# Patient Record
Sex: FEMALE | Race: BLACK OR AFRICAN AMERICAN | NOT HISPANIC OR LATINO | Employment: FULL TIME | URBAN - METROPOLITAN AREA
[De-identification: names, ages, dates, MRNs, and addresses within clinical notes are randomized per-mention and may not be internally consistent; named-entity substitution may affect disease eponyms.]

---

## 2023-10-24 ENCOUNTER — HOSPITAL ENCOUNTER (EMERGENCY)
Facility: HOSPITAL | Age: 43
Discharge: HOME | End: 2023-10-24
Attending: EMERGENCY MEDICINE
Payer: COMMERCIAL

## 2023-10-24 VITALS
OXYGEN SATURATION: 97 % | BODY MASS INDEX: 25.44 KG/M2 | TEMPERATURE: 98.3 F | DIASTOLIC BLOOD PRESSURE: 119 MMHG | WEIGHT: 149 LBS | SYSTOLIC BLOOD PRESSURE: 185 MMHG | HEIGHT: 64 IN | HEART RATE: 94 BPM

## 2023-10-24 DIAGNOSIS — S05.02XA ABRASION OF LEFT CORNEA, INITIAL ENCOUNTER: Primary | ICD-10-CM

## 2023-10-24 PROCEDURE — 2500000004 HC RX 250 GENERAL PHARMACY W/ HCPCS (ALT 636 FOR OP/ED): Performed by: STUDENT IN AN ORGANIZED HEALTH CARE EDUCATION/TRAINING PROGRAM

## 2023-10-24 PROCEDURE — 99284 EMERGENCY DEPT VISIT MOD MDM: CPT | Performed by: EMERGENCY MEDICINE

## 2023-10-24 PROCEDURE — 90471 IMMUNIZATION ADMIN: CPT | Performed by: STUDENT IN AN ORGANIZED HEALTH CARE EDUCATION/TRAINING PROGRAM

## 2023-10-24 PROCEDURE — 2500000001 HC RX 250 WO HCPCS SELF ADMINISTERED DRUGS (ALT 637 FOR MEDICARE OP): Performed by: STUDENT IN AN ORGANIZED HEALTH CARE EDUCATION/TRAINING PROGRAM

## 2023-10-24 PROCEDURE — 99283 EMERGENCY DEPT VISIT LOW MDM: CPT | Mod: 25

## 2023-10-24 PROCEDURE — 90715 TDAP VACCINE 7 YRS/> IM: CPT | Performed by: STUDENT IN AN ORGANIZED HEALTH CARE EDUCATION/TRAINING PROGRAM

## 2023-10-24 RX ORDER — TETRACAINE HYDROCHLORIDE 5 MG/ML
1 SOLUTION OPHTHALMIC ONCE
Status: DISCONTINUED | OUTPATIENT
Start: 2023-10-24 | End: 2023-10-24 | Stop reason: HOSPADM

## 2023-10-24 RX ORDER — ERYTHROMYCIN 5 MG/G
OINTMENT OPHTHALMIC EVERY 4 HOURS
Qty: 3.5 G | Refills: 0 | Status: SHIPPED | OUTPATIENT
Start: 2023-10-24 | End: 2023-10-31

## 2023-10-24 RX ORDER — SPIRONOLACTONE 25 MG/1
25 TABLET ORAL DAILY
Status: DISCONTINUED | OUTPATIENT
Start: 2023-10-24 | End: 2023-10-24 | Stop reason: HOSPADM

## 2023-10-24 RX ORDER — LOSARTAN POTASSIUM 50 MG/1
100 TABLET ORAL ONCE
Status: COMPLETED | OUTPATIENT
Start: 2023-10-24 | End: 2023-10-24

## 2023-10-24 RX ORDER — ERYTHROMYCIN 5 MG/G
1 OINTMENT OPHTHALMIC ONCE
Status: COMPLETED | OUTPATIENT
Start: 2023-10-24 | End: 2023-10-24

## 2023-10-24 RX ORDER — CARVEDILOL 12.5 MG/1
25 TABLET ORAL ONCE
Status: COMPLETED | OUTPATIENT
Start: 2023-10-24 | End: 2023-10-24

## 2023-10-24 RX ORDER — HYDRALAZINE HYDROCHLORIDE 25 MG/1
50 TABLET, FILM COATED ORAL ONCE
Status: COMPLETED | OUTPATIENT
Start: 2023-10-24 | End: 2023-10-24

## 2023-10-24 RX ORDER — BUMETANIDE 1 MG/1
1 TABLET ORAL ONCE
Status: DISCONTINUED | OUTPATIENT
Start: 2023-10-24 | End: 2023-10-24 | Stop reason: HOSPADM

## 2023-10-24 RX ORDER — AMLODIPINE BESYLATE 5 MG/1
10 TABLET ORAL DAILY
Status: DISCONTINUED | OUTPATIENT
Start: 2023-10-24 | End: 2023-10-24 | Stop reason: HOSPADM

## 2023-10-24 RX ADMIN — AMLODIPINE BESYLATE 10 MG: 5 TABLET ORAL at 12:31

## 2023-10-24 RX ADMIN — TETANUS TOXOID, REDUCED DIPHTHERIA TOXOID AND ACELLULAR PERTUSSIS VACCINE, ADSORBED 0.5 ML: 5; 2.5; 8; 8; 2.5 SUSPENSION INTRAMUSCULAR at 12:30

## 2023-10-24 RX ADMIN — CARVEDILOL 25 MG: 12.5 TABLET, FILM COATED ORAL at 12:31

## 2023-10-24 RX ADMIN — ERYTHROMYCIN 1 CM: 5 OINTMENT OPHTHALMIC at 12:31

## 2023-10-24 RX ADMIN — LOSARTAN POTASSIUM 100 MG: 50 TABLET, FILM COATED ORAL at 12:31

## 2023-10-24 RX ADMIN — HYDRALAZINE HYDROCHLORIDE 50 MG: 25 TABLET, FILM COATED ORAL at 12:31

## 2023-10-24 ASSESSMENT — PAIN - FUNCTIONAL ASSESSMENT: PAIN_FUNCTIONAL_ASSESSMENT: 0-10

## 2023-10-24 ASSESSMENT — COLUMBIA-SUICIDE SEVERITY RATING SCALE - C-SSRS
1. IN THE PAST MONTH, HAVE YOU WISHED YOU WERE DEAD OR WISHED YOU COULD GO TO SLEEP AND NOT WAKE UP?: NO
6. HAVE YOU EVER DONE ANYTHING, STARTED TO DO ANYTHING, OR PREPARED TO DO ANYTHING TO END YOUR LIFE?: NO
2. HAVE YOU ACTUALLY HAD ANY THOUGHTS OF KILLING YOURSELF?: NO

## 2023-10-24 ASSESSMENT — TONOMETRY
OS_IOP_MMHG: 14
IOP_HANDHELD: 1

## 2023-10-24 ASSESSMENT — VISUAL ACUITY: OU: 1

## 2023-10-24 NOTE — ED PROVIDER NOTES
HPI   Chief Complaint   Patient presents with    Eye Pain       HPI  43-year-old female with past medical history of hypertension, heart failure presented to the emergency room for evaluation of left eye redness and drainage since last Thursday.  Reports that she uses artificial eyelashes which were applied with a glue substance which she had attempted to utilize earlier in the day with irritation of the eye since.  She denies any change in her vision.  Denies any double vision or actual pain in the eye.  She denies any history of contact lens use or corrective lenses.  Does not see an eye doctor on regular basis.  Denies any other ENT complaints including sore throat, runny nose, earache or ear pain.  Denies any photosensitivity, flashes of light, floaters.                  No data recorded                Patient History   Past Medical History:   Diagnosis Date    Acute pharyngitis, unspecified     Sore throat    Adjustment disorder, unspecified     Hospitalism    Deep phlebothrombosis in pregnancy, unspecified trimester     DVT (deep vein thrombosis) in pregnancy    Dorsopathy, unspecified     Back problem    Encounter for screening for malignant neoplasm of vagina     Vaginal Pap smear    Essential (primary) hypertension 2013    Hypertension    Headache, unspecified 2013    Headache    Other conditions influencing health status     History of pregnancy    Personal history of other complications of pregnancy, childbirth and the puerperium     History of spontaneous     Personal history of other diseases of the respiratory system     History of upper respiratory infection    Personal history of other diseases of the respiratory system     History of pharyngitis    Personal history of other diseases of the respiratory system     History of allergic rhinitis    Streptococcal infection, unspecified site     Streptococcus group B infection     Past Surgical History:   Procedure Laterality Date      SECTION, LOW TRANSVERSE  2014     Section Low Transverse     No family history on file.  Social History     Tobacco Use    Smoking status: Not on file    Smokeless tobacco: Not on file   Substance Use Topics    Alcohol use: Not on file    Drug use: Not on file       Physical Exam   ED Triage Vitals   Temp Heart Rate Resp BP   10/24/23 1134 10/24/23 1134 -- 10/24/23 1136   36.8 °C (98.3 °F) 94  (!) 185/119      SpO2 Temp Source Heart Rate Source Patient Position   10/24/23 1134 10/24/23 1134 10/24/23 1134 10/24/23 1134   97 % Oral Monitor Sitting      BP Location FiO2 (%)     10/24/23 1134 --     Right arm        Physical Exam  Vitals and nursing note reviewed.   Constitutional:       General: She is not in acute distress.     Appearance: She is well-developed. She is not ill-appearing.   HENT:      Head: Normocephalic and atraumatic.      Nose: No congestion or rhinorrhea.      Mouth/Throat:      Mouth: Mucous membranes are moist.      Pharynx: No oropharyngeal exudate or posterior oropharyngeal erythema.   Eyes:      General: Lids are normal. Lids are everted, no foreign bodies appreciated. Vision grossly intact. Gaze aligned appropriately. No visual field deficit or scleral icterus.        Right eye: No foreign body, discharge or hordeolum.         Left eye: No foreign body, discharge or hordeolum.      Intraocular pressure: Left eye pressure is 14 mmHg. Measurements were taken using a handheld tonometer.     Extraocular Movements: Extraocular movements intact.      Conjunctiva/sclera:      Right eye: Right conjunctiva is not injected. No chemosis, exudate or hemorrhage.     Left eye: Left conjunctiva is injected. No chemosis, exudate or hemorrhage.     Comments: Small 2mm uptake of fluorescein at the 12 o'clock position at the top of the limbus.  Negative Diana sign.   Cardiovascular:      Rate and Rhythm: Normal rate and regular rhythm.      Pulses: Normal pulses.      Heart sounds: No  murmur heard.     No gallop.   Pulmonary:      Effort: Pulmonary effort is normal. No respiratory distress.      Breath sounds: Normal breath sounds. No stridor. No wheezing, rhonchi or rales.   Abdominal:      General: Bowel sounds are normal. There is no distension.      Palpations: Abdomen is soft.      Tenderness: There is no abdominal tenderness. There is no guarding or rebound.   Musculoskeletal:         General: No swelling.      Cervical back: Neck supple.   Skin:     General: Skin is warm and dry.      Capillary Refill: Capillary refill takes less than 2 seconds.      Findings: No rash.   Neurological:      General: No focal deficit present.      Mental Status: She is alert and oriented to person, place, and time.      Cranial Nerves: No cranial nerve deficit.      Sensory: No sensory deficit.      Gait: Gait normal.   Psychiatric:         Mood and Affect: Mood normal.         Behavior: Behavior normal.         Thought Content: Thought content normal.         ED Course & MDM   ED Course as of 10/24/23 1218   Tue Oct 24, 2023   1216 Tetanus updated. Patient unaware when last tetanus was administered. She will follow up with her PCP regarding blood pressure.  [TL]      ED Course User Index  [TL] Shemar Carroll DO         Diagnoses as of 10/24/23 1218   Abrasion of left cornea, initial encounter       Medical Decision Making  Overall well-appearing 43-year-old female.  Has elevated blood pressure in the emergency room which I believe is secondary to the fact that she did not take her blood pressure medication at home.  Home regimen ordered.  She is not having any signs or symptoms of hypertensive emergency.  She is not having any eye pain.  She does have a small uptake of fluorescein staining consistent with corneal abrasion.  To be treated with erythromycin ointment and to follow-up with ophthalmology.  Return precautions explained.  No sign of foreign body, preseptal or septal cellulitis.  No significant  eye drainage.  I am not concerned for intraocular abnormality.  No sign of acute glaucoma with normal intraocular pressure.    Shemar Carroll DO, PGY4  Emergency Medicine Resident    Case Discussed With Attending Physician            Procedure  Procedures     Shemar Carroll DO  Resident  10/24/23 1210       Shemar Carroll DO  Resident  10/24/23 1218    I confirm that I was present for the key and critical portions of the service, including a review of the patient's history and other pertinent data.  I personally examined the patient, and formulated the evaluation and/or treatment plan.  I have reviewed the note of the house staff and agree with the findings documented in the note, with any exceptions as noted below.    Brian Nichols DO  Attending Physician       Brian Nichols DO  11/29/23 9010

## 2023-10-24 NOTE — ED TRIAGE NOTES
Pt presents to ED with chief complaint of left eye pain and swelling that started on thrusday. Pt says she recently had her eyelashes done and that may be the cause.

## 2023-11-14 ENCOUNTER — HOSPITAL ENCOUNTER (INPATIENT)
Facility: HOSPITAL | Age: 43
LOS: 3 days | Discharge: HOME | End: 2023-11-17
Attending: EMERGENCY MEDICINE | Admitting: INTERNAL MEDICINE
Payer: COMMERCIAL

## 2023-11-14 ENCOUNTER — APPOINTMENT (OUTPATIENT)
Dept: RADIOLOGY | Facility: HOSPITAL | Age: 43
End: 2023-11-14
Payer: COMMERCIAL

## 2023-11-14 DIAGNOSIS — I50.9 ACUTE ON CHRONIC CONGESTIVE HEART FAILURE, UNSPECIFIED HEART FAILURE TYPE (MULTI): ICD-10-CM

## 2023-11-14 DIAGNOSIS — I26.99 PULMONARY EMBOLISM, OTHER, UNSPECIFIED CHRONICITY, UNSPECIFIED WHETHER ACUTE COR PULMONALE PRESENT (MULTI): ICD-10-CM

## 2023-11-14 DIAGNOSIS — I16.0 HYPERTENSIVE URGENCY: Primary | ICD-10-CM

## 2023-11-14 DIAGNOSIS — I16.1 HYPERTENSIVE EMERGENCY: ICD-10-CM

## 2023-11-14 LAB
ALBUMIN SERPL BCP-MCNC: 3.3 G/DL (ref 3.4–5)
ALP SERPL-CCNC: 175 U/L (ref 33–110)
ALT SERPL W P-5'-P-CCNC: 32 U/L (ref 7–45)
ANION GAP BLDV CALCULATED.4IONS-SCNC: 10 MMOL/L (ref 10–25)
ANION GAP SERPL CALC-SCNC: 15 MMOL/L (ref 10–20)
AST SERPL W P-5'-P-CCNC: 30 U/L (ref 9–39)
BASE EXCESS BLDV CALC-SCNC: 0.7 MMOL/L (ref -2–3)
BASOPHILS # BLD AUTO: 0.05 X10*3/UL (ref 0–0.1)
BASOPHILS NFR BLD AUTO: 0.7 %
BILIRUB SERPL-MCNC: 1.1 MG/DL (ref 0–1.2)
BNP SERPL-MCNC: 1485 PG/ML (ref 0–99)
BODY TEMPERATURE: 37 DEGREES CELSIUS
BUN SERPL-MCNC: 21 MG/DL (ref 6–23)
CA-I BLDV-SCNC: 1.19 MMOL/L (ref 1.1–1.33)
CALCIUM SERPL-MCNC: 8.9 MG/DL (ref 8.6–10.6)
CARDIAC TROPONIN I PNL SERPL HS: 111 NG/L (ref 0–34)
CARDIAC TROPONIN I PNL SERPL HS: 75 NG/L (ref 0–34)
CHLORIDE BLDV-SCNC: 105 MMOL/L (ref 98–107)
CHLORIDE SERPL-SCNC: 106 MMOL/L (ref 98–107)
CO2 SERPL-SCNC: 22 MMOL/L (ref 21–32)
CREAT SERPL-MCNC: 1.14 MG/DL (ref 0.5–1.05)
EOSINOPHIL # BLD AUTO: 0.07 X10*3/UL (ref 0–0.7)
EOSINOPHIL NFR BLD AUTO: 1 %
ERYTHROCYTE [DISTWIDTH] IN BLOOD BY AUTOMATED COUNT: 14.6 % (ref 11.5–14.5)
GFR SERPL CREATININE-BSD FRML MDRD: 62 ML/MIN/1.73M*2
GLUCOSE BLDV-MCNC: 117 MG/DL (ref 74–99)
GLUCOSE SERPL-MCNC: 106 MG/DL (ref 74–99)
HCO3 BLDV-SCNC: 24.3 MMOL/L (ref 22–26)
HCT VFR BLD AUTO: 36.9 % (ref 36–46)
HCT VFR BLD EST: 38 % (ref 36–46)
HGB BLD-MCNC: 12.2 G/DL (ref 12–16)
HGB BLDV-MCNC: 12.8 G/DL (ref 12–16)
IMM GRANULOCYTES # BLD AUTO: 0.01 X10*3/UL (ref 0–0.7)
IMM GRANULOCYTES NFR BLD AUTO: 0.1 % (ref 0–0.9)
LACTATE BLDV-SCNC: 1.8 MMOL/L (ref 0.4–2)
LYMPHOCYTES # BLD AUTO: 2.17 X10*3/UL (ref 1.2–4.8)
LYMPHOCYTES NFR BLD AUTO: 30.6 %
MAGNESIUM SERPL-MCNC: 1.55 MG/DL (ref 1.6–2.4)
MCH RBC QN AUTO: 28.4 PG (ref 26–34)
MCHC RBC AUTO-ENTMCNC: 33.1 G/DL (ref 32–36)
MCV RBC AUTO: 86 FL (ref 80–100)
MONOCYTES # BLD AUTO: 0.63 X10*3/UL (ref 0.1–1)
MONOCYTES NFR BLD AUTO: 8.9 %
NEUTROPHILS # BLD AUTO: 4.16 X10*3/UL (ref 1.2–7.7)
NEUTROPHILS NFR BLD AUTO: 58.7 %
NRBC BLD-RTO: 0 /100 WBCS (ref 0–0)
OXYHGB MFR BLDV: 72.7 % (ref 45–75)
PCO2 BLDV: 35 MM HG (ref 41–51)
PH BLDV: 7.45 PH (ref 7.33–7.43)
PLATELET # BLD AUTO: 252 X10*3/UL (ref 150–450)
PO2 BLDV: 55 MM HG (ref 35–45)
POTASSIUM BLDV-SCNC: 4.1 MMOL/L (ref 3.5–5.3)
POTASSIUM SERPL-SCNC: 4.2 MMOL/L (ref 3.5–5.3)
PROT SERPL-MCNC: 7 G/DL (ref 6.4–8.2)
RBC # BLD AUTO: 4.3 X10*6/UL (ref 4–5.2)
SAO2 % BLDV: 76 % (ref 45–75)
SODIUM BLDV-SCNC: 135 MMOL/L (ref 136–145)
SODIUM SERPL-SCNC: 139 MMOL/L (ref 136–145)
WBC # BLD AUTO: 7.1 X10*3/UL (ref 4.4–11.3)

## 2023-11-14 PROCEDURE — 2500000005 HC RX 250 GENERAL PHARMACY W/O HCPCS: Mod: SE

## 2023-11-14 PROCEDURE — 84484 ASSAY OF TROPONIN QUANT: CPT

## 2023-11-14 PROCEDURE — 84484 ASSAY OF TROPONIN QUANT: CPT | Performed by: EMERGENCY MEDICINE

## 2023-11-14 PROCEDURE — 71046 X-RAY EXAM CHEST 2 VIEWS: CPT | Performed by: RADIOLOGY

## 2023-11-14 PROCEDURE — 2500000004 HC RX 250 GENERAL PHARMACY W/ HCPCS (ALT 636 FOR OP/ED): Mod: SE | Performed by: EMERGENCY MEDICINE

## 2023-11-14 PROCEDURE — 5A09357 ASSISTANCE WITH RESPIRATORY VENTILATION, LESS THAN 24 CONSECUTIVE HOURS, CONTINUOUS POSITIVE AIRWAY PRESSURE: ICD-10-PCS | Performed by: EMERGENCY MEDICINE

## 2023-11-14 PROCEDURE — 82805 BLOOD GASES W/O2 SATURATION: CPT

## 2023-11-14 PROCEDURE — 2500000001 HC RX 250 WO HCPCS SELF ADMINISTERED DRUGS (ALT 637 FOR MEDICARE OP): Mod: SE | Performed by: EMERGENCY MEDICINE

## 2023-11-14 PROCEDURE — 2500000001 HC RX 250 WO HCPCS SELF ADMINISTERED DRUGS (ALT 637 FOR MEDICARE OP): Performed by: STUDENT IN AN ORGANIZED HEALTH CARE EDUCATION/TRAINING PROGRAM

## 2023-11-14 PROCEDURE — 1100000001 HC PRIVATE ROOM DAILY

## 2023-11-14 PROCEDURE — 83735 ASSAY OF MAGNESIUM: CPT

## 2023-11-14 PROCEDURE — 85025 COMPLETE CBC W/AUTO DIFF WBC: CPT

## 2023-11-14 PROCEDURE — 84132 ASSAY OF SERUM POTASSIUM: CPT

## 2023-11-14 PROCEDURE — 99291 CRITICAL CARE FIRST HOUR: CPT | Performed by: EMERGENCY MEDICINE

## 2023-11-14 PROCEDURE — 82435 ASSAY OF BLOOD CHLORIDE: CPT

## 2023-11-14 PROCEDURE — 2500000001 HC RX 250 WO HCPCS SELF ADMINISTERED DRUGS (ALT 637 FOR MEDICARE OP)

## 2023-11-14 PROCEDURE — 94660 CPAP INITIATION&MGMT: CPT

## 2023-11-14 PROCEDURE — 71046 X-RAY EXAM CHEST 2 VIEWS: CPT

## 2023-11-14 PROCEDURE — 2500000005 HC RX 250 GENERAL PHARMACY W/O HCPCS: Mod: SE | Performed by: EMERGENCY MEDICINE

## 2023-11-14 PROCEDURE — 83880 ASSAY OF NATRIURETIC PEPTIDE: CPT

## 2023-11-14 PROCEDURE — 2500000004 HC RX 250 GENERAL PHARMACY W/ HCPCS (ALT 636 FOR OP/ED)

## 2023-11-14 RX ORDER — LOSARTAN POTASSIUM 100 MG/1
100 TABLET ORAL
Status: ON HOLD | COMMUNITY
Start: 2023-01-27 | End: 2023-11-17 | Stop reason: SDUPTHER

## 2023-11-14 RX ORDER — CARVEDILOL 25 MG/1
25 TABLET ORAL 2 TIMES DAILY
Status: ON HOLD | COMMUNITY
Start: 2023-01-27 | End: 2023-11-17 | Stop reason: SDUPTHER

## 2023-11-14 RX ORDER — BUMETANIDE 1 MG/1
1 TABLET ORAL
Status: ON HOLD | COMMUNITY
Start: 2023-01-27 | End: 2023-11-17 | Stop reason: SDUPTHER

## 2023-11-14 RX ORDER — HYDRALAZINE HYDROCHLORIDE 100 MG/1
100 TABLET, FILM COATED ORAL 3 TIMES DAILY
Status: ON HOLD | COMMUNITY
Start: 2023-01-27 | End: 2023-11-17 | Stop reason: SDUPTHER

## 2023-11-14 RX ORDER — LOSARTAN POTASSIUM 50 MG/1
100 TABLET ORAL
Status: DISCONTINUED | OUTPATIENT
Start: 2023-11-15 | End: 2023-11-14

## 2023-11-14 RX ORDER — MAGNESIUM SULFATE HEPTAHYDRATE 40 MG/ML
2 INJECTION, SOLUTION INTRAVENOUS ONCE
Status: CANCELLED | OUTPATIENT
Start: 2023-11-14 | End: 2023-11-14

## 2023-11-14 RX ORDER — HYDRALAZINE HYDROCHLORIDE 50 MG/1
100 TABLET, FILM COATED ORAL 3 TIMES DAILY
Status: DISCONTINUED | OUTPATIENT
Start: 2023-11-14 | End: 2023-11-16

## 2023-11-14 RX ORDER — POLYETHYLENE GLYCOL 3350 17 G/17G
17 POWDER, FOR SOLUTION ORAL DAILY
Status: DISCONTINUED | OUTPATIENT
Start: 2023-11-14 | End: 2023-11-17 | Stop reason: HOSPADM

## 2023-11-14 RX ORDER — FUROSEMIDE 10 MG/ML
20 INJECTION INTRAMUSCULAR; INTRAVENOUS ONCE
Status: COMPLETED | OUTPATIENT
Start: 2023-11-14 | End: 2023-11-14

## 2023-11-14 RX ORDER — DOXYCYCLINE 100 MG/1
100 TABLET ORAL 2 TIMES DAILY
Qty: 20 TABLET | Refills: 0 | Status: CANCELLED | OUTPATIENT
Start: 2023-11-14 | End: 2023-11-24

## 2023-11-14 RX ORDER — AMLODIPINE BESYLATE 10 MG/1
10 TABLET ORAL DAILY
Status: DISCONTINUED | OUTPATIENT
Start: 2023-11-15 | End: 2023-11-15

## 2023-11-14 RX ORDER — AMLODIPINE BESYLATE 10 MG/1
10 TABLET ORAL
Status: ON HOLD | COMMUNITY
Start: 2023-01-27 | End: 2023-11-17 | Stop reason: SDUPTHER

## 2023-11-14 RX ORDER — LOSARTAN POTASSIUM 50 MG/1
100 TABLET ORAL ONCE
Status: COMPLETED | OUTPATIENT
Start: 2023-11-14 | End: 2023-11-14

## 2023-11-14 RX ORDER — IBUPROFEN 200 MG
400 TABLET ORAL EVERY 6 HOURS PRN
Status: ON HOLD | COMMUNITY
Start: 2022-10-01 | End: 2023-11-17 | Stop reason: SDUPTHER

## 2023-11-14 RX ORDER — NITROGLYCERIN 20 MG/100ML
5-200 INJECTION INTRAVENOUS CONTINUOUS
Status: DISCONTINUED | OUTPATIENT
Start: 2023-11-14 | End: 2023-11-14

## 2023-11-14 RX ORDER — AMLODIPINE BESYLATE 5 MG/1
10 TABLET ORAL
Status: DISCONTINUED | OUTPATIENT
Start: 2023-11-15 | End: 2023-11-14

## 2023-11-14 RX ORDER — LOSARTAN POTASSIUM 50 MG/1
100 TABLET ORAL DAILY
Status: DISCONTINUED | OUTPATIENT
Start: 2023-11-15 | End: 2023-11-17 | Stop reason: HOSPADM

## 2023-11-14 RX ORDER — SPIRONOLACTONE 25 MG/1
25 TABLET ORAL
Status: ON HOLD | COMMUNITY
Start: 2023-01-27 | End: 2023-11-17 | Stop reason: SDUPTHER

## 2023-11-14 RX ORDER — CARVEDILOL 12.5 MG/1
25 TABLET ORAL ONCE
Status: COMPLETED | OUTPATIENT
Start: 2023-11-14 | End: 2023-11-14

## 2023-11-14 RX ORDER — CARVEDILOL 25 MG/1
25 TABLET ORAL
Status: DISCONTINUED | OUTPATIENT
Start: 2023-11-15 | End: 2023-11-17 | Stop reason: HOSPADM

## 2023-11-14 RX ORDER — AMLODIPINE BESYLATE 5 MG/1
10 TABLET ORAL ONCE
Status: COMPLETED | OUTPATIENT
Start: 2023-11-14 | End: 2023-11-14

## 2023-11-14 RX ORDER — SPIRONOLACTONE 25 MG/1
25 TABLET ORAL
Status: DISCONTINUED | OUTPATIENT
Start: 2023-11-15 | End: 2023-11-14

## 2023-11-14 RX ORDER — SPIRONOLACTONE 25 MG/1
25 TABLET ORAL DAILY
Status: DISCONTINUED | OUTPATIENT
Start: 2023-11-15 | End: 2023-11-17 | Stop reason: HOSPADM

## 2023-11-14 RX ORDER — SENNOSIDES 8.6 MG/1
2 TABLET ORAL 2 TIMES DAILY
Status: DISCONTINUED | OUTPATIENT
Start: 2023-11-14 | End: 2023-11-17 | Stop reason: HOSPADM

## 2023-11-14 RX ADMIN — NITROGLYCERIN 5 MCG/MIN: 20 INJECTION INTRAVENOUS at 14:01

## 2023-11-14 RX ADMIN — AMLODIPINE BESYLATE 10 MG: 5 TABLET ORAL at 15:00

## 2023-11-14 RX ADMIN — CARVEDILOL 25 MG: 12.5 TABLET, FILM COATED ORAL at 15:00

## 2023-11-14 RX ADMIN — SENNOSIDES 17.2 MG: 8.6 TABLET, FILM COATED ORAL at 20:42

## 2023-11-14 RX ADMIN — Medication 30 PERCENT: at 15:15

## 2023-11-14 RX ADMIN — LOSARTAN POTASSIUM 100 MG: 50 TABLET, FILM COATED ORAL at 15:00

## 2023-11-14 RX ADMIN — HYDRALAZINE HYDROCHLORIDE 100 MG: 25 TABLET, FILM COATED ORAL at 20:41

## 2023-11-14 RX ADMIN — FUROSEMIDE 20 MG: 10 INJECTION, SOLUTION INTRAVENOUS at 15:00

## 2023-11-14 RX ADMIN — FUROSEMIDE 20 MG: 10 INJECTION, SOLUTION INTRAMUSCULAR; INTRAVENOUS at 20:42

## 2023-11-14 SDOH — SOCIAL STABILITY: SOCIAL INSECURITY: ARE THERE ANY APPARENT SIGNS OF INJURIES/BEHAVIORS THAT COULD BE RELATED TO ABUSE/NEGLECT?: NO

## 2023-11-14 SDOH — SOCIAL STABILITY: SOCIAL INSECURITY: DO YOU FEEL UNSAFE GOING BACK TO THE PLACE WHERE YOU ARE LIVING?: NO

## 2023-11-14 SDOH — SOCIAL STABILITY: SOCIAL INSECURITY: WERE YOU ABLE TO COMPLETE ALL THE BEHAVIORAL HEALTH SCREENINGS?: YES

## 2023-11-14 SDOH — SOCIAL STABILITY: SOCIAL INSECURITY: HAS ANYONE EVER THREATENED TO HURT YOUR FAMILY OR YOUR PETS?: NO

## 2023-11-14 SDOH — HEALTH STABILITY: MENTAL HEALTH: EXPERIENCED ANY OF THE FOLLOWING LIFE EVENTS: DEATH OF FAMILY/FRIEND

## 2023-11-14 SDOH — SOCIAL STABILITY: SOCIAL INSECURITY: HAVE YOU HAD THOUGHTS OF HARMING ANYONE ELSE?: NO

## 2023-11-14 SDOH — SOCIAL STABILITY: SOCIAL INSECURITY: DO YOU FEEL ANYONE HAS EXPLOITED OR TAKEN ADVANTAGE OF YOU FINANCIALLY OR OF YOUR PERSONAL PROPERTY?: NO

## 2023-11-14 SDOH — SOCIAL STABILITY: SOCIAL INSECURITY: ARE YOU OR HAVE YOU BEEN THREATENED OR ABUSED PHYSICALLY, EMOTIONALLY, OR SEXUALLY BY ANYONE?: NO

## 2023-11-14 SDOH — SOCIAL STABILITY: SOCIAL INSECURITY: ABUSE: ADULT

## 2023-11-14 SDOH — SOCIAL STABILITY: SOCIAL INSECURITY: DOES ANYONE TRY TO KEEP YOU FROM HAVING/CONTACTING OTHER FRIENDS OR DOING THINGS OUTSIDE YOUR HOME?: NO

## 2023-11-14 ASSESSMENT — PAIN DESCRIPTION - PAIN TYPE: TYPE: ACUTE PAIN

## 2023-11-14 ASSESSMENT — ENCOUNTER SYMPTOMS
CONFUSION: 0
JOINT SWELLING: 0
BRUISES/BLEEDS EASILY: 0
CHEST TIGHTNESS: 0
COUGH: 1
SORE THROAT: 0
LIGHT-HEADEDNESS: 0
NUMBNESS: 0
DIAPHORESIS: 0
VOMITING: 0
ABDOMINAL PAIN: 0
CHILLS: 0
NAUSEA: 0
COLOR CHANGE: 0
DIARRHEA: 0
BLOOD IN STOOL: 0
BACK PAIN: 0
FATIGUE: 0
FEVER: 0
DIFFICULTY URINATING: 0
HEMATURIA: 0
POLYDIPSIA: 0
ARTHRALGIAS: 0
AGITATION: 0
ABDOMINAL DISTENTION: 1
CONSTIPATION: 0
HEADACHES: 1
SHORTNESS OF BREATH: 1
DYSURIA: 0

## 2023-11-14 ASSESSMENT — LIFESTYLE VARIABLES
HOW OFTEN DO YOU HAVE 6 OR MORE DRINKS ON ONE OCCASION: NEVER
SUBSTANCE_ABUSE_PAST_12_MONTHS: NO
HAVE PEOPLE ANNOYED YOU BY CRITICIZING YOUR DRINKING: NO
SKIP TO QUESTIONS 9-10: 1
HOW MANY STANDARD DRINKS CONTAINING ALCOHOL DO YOU HAVE ON A TYPICAL DAY: PATIENT DOES NOT DRINK
REASON UNABLE TO ASSESS: NO
HOW OFTEN DO YOU HAVE A DRINK CONTAINING ALCOHOL: NEVER
AUDIT-C TOTAL SCORE: 0
AUDIT-C TOTAL SCORE: 0
PRESCIPTION_ABUSE_PAST_12_MONTHS: NO
EVER HAD A DRINK FIRST THING IN THE MORNING TO STEADY YOUR NERVES TO GET RID OF A HANGOVER: NO
HAVE YOU EVER FELT YOU SHOULD CUT DOWN ON YOUR DRINKING: NO
HOW OFTEN DO YOU HAVE 6 OR MORE DRINKS ON ONE OCCASION: NEVER
HOW MANY STANDARD DRINKS CONTAINING ALCOHOL DO YOU HAVE ON A TYPICAL DAY: PATIENT DOES NOT DRINK
HOW OFTEN DO YOU HAVE A DRINK CONTAINING ALCOHOL: NEVER
AUDIT-C TOTAL SCORE: 0
SKIP TO QUESTIONS 9-10: 1
PRESCIPTION_ABUSE_PAST_12_MONTHS: NO
AUDIT-C TOTAL SCORE: 0
EVER FELT BAD OR GUILTY ABOUT YOUR DRINKING: NO
SUBSTANCE_ABUSE_PAST_12_MONTHS: NO

## 2023-11-14 ASSESSMENT — ACTIVITIES OF DAILY LIVING (ADL)
GROOMING: INDEPENDENT
TOILETING: INDEPENDENT
DRESSING YOURSELF: INDEPENDENT
BATHING: INDEPENDENT
ADEQUATE_TO_COMPLETE_ADL: YES
FEEDING YOURSELF: INDEPENDENT
WALKS IN HOME: INDEPENDENT
PATIENT'S MEMORY ADEQUATE TO SAFELY COMPLETE DAILY ACTIVITIES?: YES
JUDGMENT_ADEQUATE_SAFELY_COMPLETE_DAILY_ACTIVITIES: YES
HEARING - RIGHT EAR: FUNCTIONAL

## 2023-11-14 ASSESSMENT — PATIENT HEALTH QUESTIONNAIRE - PHQ9
1. LITTLE INTEREST OR PLEASURE IN DOING THINGS: NOT AT ALL
1. LITTLE INTEREST OR PLEASURE IN DOING THINGS: NOT AT ALL
2. FEELING DOWN, DEPRESSED OR HOPELESS: NOT AT ALL
2. FEELING DOWN, DEPRESSED OR HOPELESS: NOT AT ALL
SUM OF ALL RESPONSES TO PHQ9 QUESTIONS 1 & 2: 0
SUM OF ALL RESPONSES TO PHQ9 QUESTIONS 1 & 2: 0

## 2023-11-14 ASSESSMENT — PAIN DESCRIPTION - DESCRIPTORS
DESCRIPTORS: DULL
DESCRIPTORS: DULL

## 2023-11-14 ASSESSMENT — PAIN - FUNCTIONAL ASSESSMENT: PAIN_FUNCTIONAL_ASSESSMENT: 0-10

## 2023-11-14 ASSESSMENT — PAIN DESCRIPTION - ONSET: ONSET: AWAKENED FROM SLEEP

## 2023-11-14 ASSESSMENT — PAIN SCALES - GENERAL
PAINLEVEL_OUTOF10: 5 - MODERATE PAIN
PAINLEVEL_OUTOF10: 0 - NO PAIN
PAINLEVEL_OUTOF10: 5 - MODERATE PAIN
PAINLEVEL_OUTOF10: 7

## 2023-11-14 ASSESSMENT — PAIN DESCRIPTION - ORIENTATION: ORIENTATION: MID

## 2023-11-14 ASSESSMENT — COLUMBIA-SUICIDE SEVERITY RATING SCALE - C-SSRS
6. HAVE YOU EVER DONE ANYTHING, STARTED TO DO ANYTHING, OR PREPARED TO DO ANYTHING TO END YOUR LIFE?: NO
2. HAVE YOU ACTUALLY HAD ANY THOUGHTS OF KILLING YOURSELF?: NO
1. IN THE PAST MONTH, HAVE YOU WISHED YOU WERE DEAD OR WISHED YOU COULD GO TO SLEEP AND NOT WAKE UP?: NO

## 2023-11-14 ASSESSMENT — PAIN DESCRIPTION - LOCATION: LOCATION: CHEST

## 2023-11-14 ASSESSMENT — PAIN DESCRIPTION - FREQUENCY: FREQUENCY: CONSTANT/CONTINUOUS

## 2023-11-14 NOTE — ED PROVIDER NOTES
HPI   Chief Complaint   Patient presents with    Shortness of Breath    Chest Pain    Leg Swelling       Patient is a 43-year-old female with history of heart failure, cardiomyopathy, hypertension and DVT on apixaban as well as previous PE however not on medication presenting with chest pain, shortness of breath and leg swelling.  Patient states she has been without her diuretic medication or hypertensive medication for the past month.  Reports that she is unable to tolerate sitting or leaning back at all given how short of breath she feels.  Is also endorsing bilateral lower extremity edema.  Also endorsing significant dyspnea on exertion which is not consistent with her baseline.  Denies recent fever, chills or congestion.  Patient is endorsing a cough with whitish sputum that is persistent and uncommon for her.  Denies nausea, vomiting, diarrhea, abdominal pain, urinary symptoms including blood in the urine or stool.  No other acute complaints.                          [place] Coma Scale Score: 15                  Patient History   Past Medical History:   Diagnosis Date    Acute pharyngitis, unspecified     Sore throat    Adjustment disorder, unspecified     Hospitalism    Deep phlebothrombosis in pregnancy, unspecified trimester     DVT (deep vein thrombosis) in pregnancy    Dorsopathy, unspecified     Back problem    Encounter for screening for malignant neoplasm of vagina     Vaginal Pap smear    Essential (primary) hypertension 2013    Hypertension    Headache, unspecified 2013    Headache    Other conditions influencing health status     History of pregnancy    Personal history of other complications of pregnancy, childbirth and the puerperium     History of spontaneous     Personal history of other diseases of the respiratory system     History of upper respiratory infection    Personal history of other diseases of the respiratory system     History of pharyngitis    Personal history of  other diseases of the respiratory system     History of allergic rhinitis    Streptococcal infection, unspecified site     Streptococcus group B infection     Past Surgical History:   Procedure Laterality Date     SECTION, LOW TRANSVERSE  2014     Section Low Transverse     No family history on file.  Social History     Tobacco Use    Smoking status: Every Day     Packs/day: 1     Types: Cigars, Cigarettes    Smokeless tobacco: Not on file   Substance Use Topics    Alcohol use: Not Currently    Drug use: Not Currently       Physical Exam   ED Triage Vitals [23 1310]   Temp Heart Rate Resp BP   37 °C (98.6 °F) (!) 118 24 (!) 214/121      SpO2 Temp src Heart Rate Source Patient Position   92 % -- Monitor --      BP Location FiO2 (%)     -- 21 %       Physical Exam  Vitals and nursing note reviewed.   Constitutional:       General: She is not in acute distress.     Appearance: Normal appearance. She is not toxic-appearing.   HENT:      Head: Normocephalic.      Mouth/Throat:      Mouth: Mucous membranes are moist.   Eyes:      Conjunctiva/sclera: Conjunctivae normal.      Pupils: Pupils are equal, round, and reactive to light.   Cardiovascular:      Rate and Rhythm: Regular rhythm. Tachycardia present.      Pulses:           Radial pulses are 2+ on the right side and 2+ on the left side.   Pulmonary:      Effort: Tachypnea and respiratory distress present.      Breath sounds: Rales present.   Abdominal:      General: Abdomen is flat.      Palpations: Abdomen is soft.      Tenderness: There is no abdominal tenderness. There is no guarding or rebound.   Musculoskeletal:      Cervical back: Normal range of motion and neck supple.      Right lower leg: Edema present.      Left lower leg: Edema present.   Skin:     General: Skin is warm.   Neurological:      Mental Status: She is alert and oriented to person, place, and time.   Psychiatric:         Mood and Affect: Mood normal.         ED Course  & MDM   Diagnoses as of 11/15/23 2115   Hypertensive emergency   Acute on chronic congestive heart failure, unspecified heart failure type (CMS/HCC)       Medical Decision Making  This is a 42 y/o patient with history of heart failure, presenting with likely acute decompensated heart failure causing volume overload and pulmonary edema. The etiology of the decompensation is not certain but is likely due to missed medications. Alternative etiologies I considered include cardiac (ACS, valvular disease, arrhythmia, myocarditis/endocarditis, dissection) however given unremarkable trop, ekg, cardiac exam have low suspicion. Also considered but low risk for respiratory cause (COPD, asthma, PE, or PNA).  Patient does have a history of DVT and PE and is not currently on her apixaban so this could also be contributing to her current presentation.  Patient started on nitro drip, given 20 mg IV Lasix with initial blood pressures noted to be 214/127.  Chest x-ray is consistent with fluid overload with interstitial edema bilaterally.  Labs are consistent with acute decompensated heart failure.  Patient placed on BiPAP to assist in her respiratory status but patient will require admission given her clinical presentation and exam findings.  At time of signout, patient was pending reevaluation as well as remainder of work-up.  Please refer to incoming resident for further hospital course and final disposition.        Procedure  Procedures     Nelly Hussein DO  Resident  11/15/23 2115

## 2023-11-14 NOTE — ED TRIAGE NOTES
Ptt to ed via ems with c/o excessive cough, hypertension, sob, cp and leg swelling with symptoms starting two weeks ago and progressively getting worse

## 2023-11-14 NOTE — PROGRESS NOTES
Emergency Medicine Transition of Care Note.    I received Lorri Sams in signout from Dr. Hussein.  Please see the previous ED provider note for all HPI, PE and MDM up to the time of signout at 1500. This is in addition to the primary record.    In brief Lorri Sams is an 43 y.o. female with a past medical history of DVT on apixaban, cardiomyopathy, heart failure and hypertension presenting for chest pain, SOB, and leg swelling.  Patient noted to be significantly hypertensive at 214/121 and tachycardic at 118.  Patient's work-up was significant for initiation of nitroglycerin drip and BiPAP with concern for CHF exacerbation and near flash pulmonary edema, initiated on home antihypertensives, initiated on Lasix, elevated troponin of 111, and elevated BNP of 1485.  EKG significant for T wave inversions in the inferior leads.  CXR displayed findings concerning for cardiomegaly and mild pulmonary edema.    Chief Complaint   Patient presents with    Shortness of Breath    Chest Pain    Leg Swelling     At the time of signout we were awaiting: Reevaluation, repeat troponin    Diagnoses as of 11/15/23 1603   Hypertensive emergency   Acute on chronic congestive heart failure, unspecified heart failure type (CMS/[facility])       Medical Decision Making  Patient's blood pressure improved to 180s/110s 1 hour after nitro drip was initiated.  Her blood pressure continued to improve to 140/100s approximately 2 hours after nitro drip was initiated.  Nitro drip was discontinued.  Patient has significant improvement of her work of breathing as well as significant diuresis of ~800cc in 3 hours.  Repeat troponin was 75.  Low clinical concern for ACS with the patient's elevated troponin likely secondary to hypertensive emergency.  EKG displayed improved T wave inversions of the inferior leads. Noted to be on bipap for ~2 hours. Patient was trialed off BiPAP with improvement of her work of breathing.  She was transitioned to initially nasal  cannula and then room air.  Patient was on the tolerate p.o. in the emergency department.  Patient remains stable off BiPAP for 2 hours before medicine team was contacted for admission.  Discussed ED findings and admission with patient.  Patient stated understanding and agree with the plan.  All questions were answered.  Discussed patient presentation with admitting team.  Patient admitted to cardiology for hypertensive emergency and CHF exacerbation in stable condition.    Final diagnoses:   [I16.1] Hypertensive emergency   [I50.9] Acute on chronic congestive heart failure, unspecified heart failure type (CMS/[facility])           Procedure  Procedures    Addison Day MD

## 2023-11-15 ENCOUNTER — APPOINTMENT (OUTPATIENT)
Dept: CARDIOLOGY | Facility: HOSPITAL | Age: 43
End: 2023-11-15
Payer: COMMERCIAL

## 2023-11-15 ENCOUNTER — PHARMACY VISIT (OUTPATIENT)
Dept: PHARMACY | Facility: CLINIC | Age: 43
End: 2023-11-15
Payer: MEDICAID

## 2023-11-15 LAB
ALBUMIN SERPL BCP-MCNC: 2.9 G/DL (ref 3.4–5)
ANION GAP SERPL CALC-SCNC: 15 MMOL/L (ref 10–20)
ATRIAL RATE: 104 BPM
ATRIAL RATE: 77 BPM
BASOPHILS # BLD AUTO: 0.04 X10*3/UL (ref 0–0.1)
BASOPHILS NFR BLD AUTO: 0.8 %
BUN SERPL-MCNC: 19 MG/DL (ref 6–23)
CALCIUM SERPL-MCNC: 8.1 MG/DL (ref 8.6–10.6)
CHLORIDE SERPL-SCNC: 101 MMOL/L (ref 98–107)
CO2 SERPL-SCNC: 24 MMOL/L (ref 21–32)
CREAT SERPL-MCNC: 1.18 MG/DL (ref 0.5–1.05)
EOSINOPHIL # BLD AUTO: 0.12 X10*3/UL (ref 0–0.7)
EOSINOPHIL NFR BLD AUTO: 2.4 %
ERYTHROCYTE [DISTWIDTH] IN BLOOD BY AUTOMATED COUNT: 14.8 % (ref 11.5–14.5)
GFR SERPL CREATININE-BSD FRML MDRD: 59 ML/MIN/1.73M*2
GLUCOSE SERPL-MCNC: 118 MG/DL (ref 74–99)
HCT VFR BLD AUTO: 37.4 % (ref 36–46)
HGB BLD-MCNC: 12.1 G/DL (ref 12–16)
IMM GRANULOCYTES # BLD AUTO: 0.01 X10*3/UL (ref 0–0.7)
IMM GRANULOCYTES NFR BLD AUTO: 0.2 % (ref 0–0.9)
LYMPHOCYTES # BLD AUTO: 2.03 X10*3/UL (ref 1.2–4.8)
LYMPHOCYTES NFR BLD AUTO: 40.3 %
MAGNESIUM SERPL-MCNC: 1.49 MG/DL (ref 1.6–2.4)
MCH RBC QN AUTO: 28.1 PG (ref 26–34)
MCHC RBC AUTO-ENTMCNC: 32.4 G/DL (ref 32–36)
MCV RBC AUTO: 87 FL (ref 80–100)
MONOCYTES # BLD AUTO: 0.5 X10*3/UL (ref 0.1–1)
MONOCYTES NFR BLD AUTO: 9.9 %
NEUTROPHILS # BLD AUTO: 2.34 X10*3/UL (ref 1.2–7.7)
NEUTROPHILS NFR BLD AUTO: 46.4 %
NRBC BLD-RTO: 0 /100 WBCS (ref 0–0)
P AXIS: 61 DEGREES
P AXIS: 63 DEGREES
P OFFSET: 195 MS
P OFFSET: 199 MS
P ONSET: 145 MS
P ONSET: 148 MS
PHOSPHATE SERPL-MCNC: 3.2 MG/DL (ref 2.5–4.9)
PLATELET # BLD AUTO: 228 X10*3/UL (ref 150–450)
POTASSIUM SERPL-SCNC: 3.3 MMOL/L (ref 3.5–5.3)
PR INTERVAL: 134 MS
PR INTERVAL: 138 MS
Q ONSET: 214 MS
Q ONSET: 215 MS
QRS COUNT: 12 BEATS
QRS COUNT: 18 BEATS
QRS DURATION: 104 MS
QRS DURATION: 104 MS
QT INTERVAL: 376 MS
QT INTERVAL: 452 MS
QTC CALCULATION(BAZETT): 494 MS
QTC CALCULATION(BAZETT): 511 MS
QTC FREDERICIA: 451 MS
QTC FREDERICIA: 491 MS
R AXIS: 11 DEGREES
R AXIS: 66 DEGREES
RBC # BLD AUTO: 4.3 X10*6/UL (ref 4–5.2)
SODIUM SERPL-SCNC: 137 MMOL/L (ref 136–145)
T AXIS: -40 DEGREES
T AXIS: 39 DEGREES
T OFFSET: 403 MS
T OFFSET: 440 MS
VENTRICULAR RATE: 104 BPM
VENTRICULAR RATE: 77 BPM
WBC # BLD AUTO: 5 X10*3/UL (ref 4.4–11.3)

## 2023-11-15 PROCEDURE — 2500000004 HC RX 250 GENERAL PHARMACY W/ HCPCS (ALT 636 FOR OP/ED)

## 2023-11-15 PROCEDURE — 85025 COMPLETE CBC W/AUTO DIFF WBC: CPT

## 2023-11-15 PROCEDURE — 2500000001 HC RX 250 WO HCPCS SELF ADMINISTERED DRUGS (ALT 637 FOR MEDICARE OP): Performed by: STUDENT IN AN ORGANIZED HEALTH CARE EDUCATION/TRAINING PROGRAM

## 2023-11-15 PROCEDURE — 99222 1ST HOSP IP/OBS MODERATE 55: CPT

## 2023-11-15 PROCEDURE — 93005 ELECTROCARDIOGRAM TRACING: CPT

## 2023-11-15 PROCEDURE — 1100000001 HC PRIVATE ROOM DAILY

## 2023-11-15 PROCEDURE — 36415 COLL VENOUS BLD VENIPUNCTURE: CPT

## 2023-11-15 PROCEDURE — 80069 RENAL FUNCTION PANEL: CPT

## 2023-11-15 PROCEDURE — 2500000001 HC RX 250 WO HCPCS SELF ADMINISTERED DRUGS (ALT 637 FOR MEDICARE OP): Performed by: EMERGENCY MEDICINE

## 2023-11-15 PROCEDURE — 2500000001 HC RX 250 WO HCPCS SELF ADMINISTERED DRUGS (ALT 637 FOR MEDICARE OP)

## 2023-11-15 PROCEDURE — 2500000004 HC RX 250 GENERAL PHARMACY W/ HCPCS (ALT 636 FOR OP/ED): Performed by: EMERGENCY MEDICINE

## 2023-11-15 PROCEDURE — 83735 ASSAY OF MAGNESIUM: CPT

## 2023-11-15 RX ORDER — POTASSIUM CHLORIDE 20 MEQ/1
20 TABLET, EXTENDED RELEASE ORAL ONCE
Status: COMPLETED | OUTPATIENT
Start: 2023-11-15 | End: 2023-11-15

## 2023-11-15 RX ORDER — LANOLIN ALCOHOL/MO/W.PET/CERES
400 CREAM (GRAM) TOPICAL DAILY
Status: DISCONTINUED | OUTPATIENT
Start: 2023-11-15 | End: 2023-11-15

## 2023-11-15 RX ORDER — MAGNESIUM SULFATE HEPTAHYDRATE 40 MG/ML
2 INJECTION, SOLUTION INTRAVENOUS ONCE
Status: COMPLETED | OUTPATIENT
Start: 2023-11-15 | End: 2023-11-15

## 2023-11-15 RX ADMIN — RIVAROXABAN 20 MG: 20 TABLET, FILM COATED ORAL at 16:31

## 2023-11-15 RX ADMIN — MAGNESIUM SULFATE HEPTAHYDRATE 2 G: 40 INJECTION, SOLUTION INTRAVENOUS at 16:31

## 2023-11-15 RX ADMIN — SENNOSIDES 17.2 MG: 8.6 TABLET, FILM COATED ORAL at 20:47

## 2023-11-15 RX ADMIN — LOSARTAN POTASSIUM 100 MG: 50 TABLET, FILM COATED ORAL at 08:29

## 2023-11-15 RX ADMIN — SENNOSIDES 17.2 MG: 8.6 TABLET, FILM COATED ORAL at 08:29

## 2023-11-15 RX ADMIN — SPIRONOLACTONE 25 MG: 25 TABLET, FILM COATED ORAL at 08:31

## 2023-11-15 RX ADMIN — AMLODIPINE BESYLATE 10 MG: 10 TABLET ORAL at 08:31

## 2023-11-15 RX ADMIN — CARVEDILOL 25 MG: 25 TABLET, FILM COATED ORAL at 08:31

## 2023-11-15 RX ADMIN — CARVEDILOL 25 MG: 25 TABLET, FILM COATED ORAL at 16:31

## 2023-11-15 RX ADMIN — POLYETHYLENE GLYCOL 3350 17 G: 17 POWDER, FOR SOLUTION ORAL at 01:14

## 2023-11-15 RX ADMIN — RIVAROXABAN 20 MG: 20 TABLET, FILM COATED ORAL at 01:14

## 2023-11-15 RX ADMIN — HYDRALAZINE HYDROCHLORIDE 100 MG: 25 TABLET, FILM COATED ORAL at 08:29

## 2023-11-15 RX ADMIN — POTASSIUM CHLORIDE 20 MEQ: 1500 TABLET, EXTENDED RELEASE ORAL at 16:31

## 2023-11-15 ASSESSMENT — COGNITIVE AND FUNCTIONAL STATUS - GENERAL
PATIENT BASELINE BEDBOUND: NO
DAILY ACTIVITIY SCORE: 24
MOBILITY SCORE: 24
MOBILITY SCORE: 24
DAILY ACTIVITIY SCORE: 24

## 2023-11-15 ASSESSMENT — ACTIVITIES OF DAILY LIVING (ADL)
FEEDING YOURSELF: INDEPENDENT
HEARING - LEFT EAR: FUNCTIONAL
JUDGMENT_ADEQUATE_SAFELY_COMPLETE_DAILY_ACTIVITIES: YES
TOILETING: INDEPENDENT
DRESSING YOURSELF: INDEPENDENT
HEARING - RIGHT EAR: FUNCTIONAL
WALKS IN HOME: INDEPENDENT
PATIENT'S MEMORY ADEQUATE TO SAFELY COMPLETE DAILY ACTIVITIES?: YES
ADEQUATE_TO_COMPLETE_ADL: YES
GROOMING: INDEPENDENT
BATHING: INDEPENDENT

## 2023-11-15 ASSESSMENT — PAIN SCALES - GENERAL: PAINLEVEL_OUTOF10: 0 - NO PAIN

## 2023-11-15 NOTE — SIGNIFICANT EVENT
History:     43 year old pmh HTN and HFpEF (2022 EF 50%), PE 12/2022 presenting with SOB, chest pain, and leg swelling. Patient states that she ran out of her medications including blood pressure meds and Bumex about 2 weeks ago because she has transportation issues to  her meds.  Patient also started having a cough with mild clear sputum production, and worsening shortness of breath since this morning.  Patient feel significantly better after getting a BiPAP and Lasix.  Patient already endorses pain a lot since her Lasix dose.  Patient denies any fever, chills, sweats, no longer has chest pain, abdominal pain, diarrhea, constipation.  The patient also endorses bloating but has past bowel movements twice in the last day, no diarrhea.    Patient has a very confusing history concerning her DVT and PE.  Patient states that she first got her DVT in 2004 while on birth control and had a repeat DVT somewhere between 2004 and today.  However, patient states that she did not start Eliquis until 2016.  Patient is not sure why it was started 2016, cannot define any triggers that year.  Patient was then possibly started on Xarelto in December 2022 at [facility] due to an acute PE.  However patient states that she has not taken because the hospital apparently did not discharge her with it.  However on her most recent cardiology note in January, it states to take the Xarelto.  Patient seems to not have a clear history on blood thinners.  Patient cannot tell us why she was discontinued on blood nerves.  Patient denies any recent travel or immobility.     12/2022 TTE:   Focally abnormal LV systolic function.   Focal LV systolic dysfunction consists of akinesia of the mid   inferolateral, basal inferolateral, basal inferior wall, mid inferior   wall.   The left ventricular ejection fraction (LVEF) is 50%.   Diastolic LV function assessed by resting Doppler is abnormal.   Globally abnormal RV systolic function.   Dilated  left ventricle, left atrium, right ventricle.   Mixed (concentric and eccentric) left ventricular hypertrophy is present.   Moderate aortic, mitral valve regurgitation.   Mild tricuspid valve regurgitation.   Noninvasive hemodynamic assessment is consistent with severe pulmonary   hypertension (>60 mmHg), an elevated CVP, an elevated LVEDP.      ED:  -Interventions: amlodipine, caredilol, Lasix 20mg, losartan 100mg   -BIPAP and Lasix 20mg   -nitro drip -> weaned off     Social hx: smokes for the last ~25 years, no alcohol use, no drug use  Allergies: NKDA  Family hx: mother and grandma has hx of blood clots     Home meds per [facility] note:   - amLODIPine (NORVASC) 10 MG tablet; Take 1 Tablet by mouth daily.  - CARvedilol (COREG) 25 MG tablet; Take 1 Tablet by mouth 2 times daily.  - hydrALAZINE (APRESOLINE) 100 MG tablet; Take 1 Tablet by mouth 3 times daily.  - losartan (COZAAR) 100 MG tablet; Take 1 Tablet by mouth daily.  - spironolactone (ALDACTONE) 25 MG tablet; Take 1 Tablet by mouth daily.  - Rivaroxaban (XARELTO) 20 MG tablet; Take 1 Tablet by mouth daily. (start after finishing the 15mg dosing)  -bumex 1mg         11/14/2023     5:32 PM 11/14/2023     5:45 PM 11/14/2023     6:00 PM 11/14/2023     6:15 PM 11/14/2023     6:30 PM 11/14/2023     6:45 PM 11/14/2023     7:44 PM   Vitals   Systolic 158 153 162 158 165 143 165   Diastolic 100 89 95 101 100 106 67   Heart Rate 80 77 76 79 77 84 85   Resp 20 31 22 22 24 30        Results for orders placed or performed during the hospital encounter of 11/14/23 (from the past 24 hour(s))   Blood Gas Venous Full Panel Unsolicited   Result Value Ref Range    POCT pH, Venous 7.45 (H) 7.33 - 7.43 pH    POCT pCO2, Venous 35 (L) 41 - 51 mm Hg    POCT pO2, Venous 55 (H) 35 - 45 mm Hg    POCT SO2, Venous 76 (H) 45 - 75 %    POCT Oxy Hemoglobin, Venous 72.7 45.0 - 75.0 %    POCT Hematocrit Calculated, Venous 38.0 36.0 - 46.0 %    POCT Sodium, Venous 135 (L) 136 - 145 mmol/L     POCT Potassium, Venous 4.1 3.5 - 5.3 mmol/L    POCT Chloride, Venous 105 98 - 107 mmol/L    POCT Ionized Calicum, Venous 1.19 1.10 - 1.33 mmol/L    POCT Glucose, Venous 117 (H) 74 - 99 mg/dL    POCT Lactate, Venous 1.8 0.4 - 2.0 mmol/L    POCT Base Excess, Venous 0.7 -2.0 - 3.0 mmol/L    POCT HCO3 Calculated, Venous 24.3 22.0 - 26.0 mmol/L    POCT Hemoglobin, Venous 12.8 12.0 - 16.0 g/dL    POCT Anion Gap, Venous 10.0 10.0 - 25.0 mmol/L    Patient Temperature 37.0 degrees Celsius   CBC and Auto Differential   Result Value Ref Range    WBC 7.1 4.4 - 11.3 x10*3/uL    nRBC 0.0 0.0 - 0.0 /100 WBCs    RBC 4.30 4.00 - 5.20 x10*6/uL    Hemoglobin 12.2 12.0 - 16.0 g/dL    Hematocrit 36.9 36.0 - 46.0 %    MCV 86 80 - 100 fL    MCH 28.4 26.0 - 34.0 pg    MCHC 33.1 32.0 - 36.0 g/dL    RDW 14.6 (H) 11.5 - 14.5 %    Platelets 252 150 - 450 x10*3/uL    Neutrophils % 58.7 40.0 - 80.0 %    Immature Granulocytes %, Automated 0.1 0.0 - 0.9 %    Lymphocytes % 30.6 13.0 - 44.0 %    Monocytes % 8.9 2.0 - 10.0 %    Eosinophils % 1.0 0.0 - 6.0 %    Basophils % 0.7 0.0 - 2.0 %    Neutrophils Absolute 4.16 1.20 - 7.70 x10*3/uL    Immature Granulocytes Absolute, Automated 0.01 0.00 - 0.70 x10*3/uL    Lymphocytes Absolute 2.17 1.20 - 4.80 x10*3/uL    Monocytes Absolute 0.63 0.10 - 1.00 x10*3/uL    Eosinophils Absolute 0.07 0.00 - 0.70 x10*3/uL    Basophils Absolute 0.05 0.00 - 0.10 x10*3/uL   Comprehensive metabolic panel   Result Value Ref Range    Glucose 106 (H) 74 - 99 mg/dL    Sodium 139 136 - 145 mmol/L    Potassium 4.2 3.5 - 5.3 mmol/L    Chloride 106 98 - 107 mmol/L    Bicarbonate 22 21 - 32 mmol/L    Anion Gap 15 10 - 20 mmol/L    Urea Nitrogen 21 6 - 23 mg/dL    Creatinine 1.14 (H) 0.50 - 1.05 mg/dL    eGFR 62 >60 mL/min/1.73m*2    Calcium 8.9 8.6 - 10.6 mg/dL    Albumin 3.3 (L) 3.4 - 5.0 g/dL    Alkaline Phosphatase 175 (H) 33 - 110 U/L    Total Protein 7.0 6.4 - 8.2 g/dL    AST 30 9 - 39 U/L    Bilirubin, Total 1.1 0.0 - 1.2  mg/dL    ALT 32 7 - 45 U/L   B-Type Natriuretic Peptide   Result Value Ref Range    BNP 1,485 (H) 0 - 99 pg/mL   Magnesium   Result Value Ref Range    Magnesium 1.55 (L) 1.60 - 2.40 mg/dL   Troponin I, High Sensitivity, Initial   Result Value Ref Range    Troponin I, High Sensitivity 111 (H) 0 - 34 ng/L   Light Blue Top   Result Value Ref Range    Extra Tube Hold for add-ons.    Red Top   Result Value Ref Range    Extra Tube Hold for add-ons.    Troponin I, High Sensitivity   Result Value Ref Range    Troponin I, High Sensitivity 75 (H) 0 - 34 ng/L         GENERAL APPEARANCE: Well developed, well nourished, alert and cooperative, and appears to be in no acute distress.  CARDIAC: Normal S1 and S2. No S3, S4 or murmurs. Rhythm is regular. 2+ bilateral edema   LUNGS: bilateral crackles   ABDOMEN: Positive bowel sounds. Soft, nondistended, nontender. No guarding or rebound. No masses.  EXTREMITIES: No significant deformity or joint abnormality. No edema.   NEUROLOGICAL: CN II-XII grossly intact. Strength and sensation symmetric and intact throughout.   SKIN: Skin normal color, texture and turgor with no lesions or eruptions.      A/P:  43 year old pmh HTN and HFpEF (2022 EF 50%), PE 12/2022 admitted for HTN emergency and ADHF after missing her medications for 2 weeks. Restart her home BP meds and diuresis. Pt briefly on nitroglycerin gtt and bipap in ED but weaned to RA and BP improved with home BP meds. Low concern for PE as pt's symptoms improved with bipap + diuresis + BP meds. Pt would like her future meds mailed to her.    #ADHF  #pulm edema   #HTN emergency   [facility]  ::s/p nitroglycerin gtt and biPAP   - Bnp 1485  - troponin peaked at 111  - amLODIPine  10 MG tablet.  - CARvedilol  25 MG tablet BID  - hydrALAZINE  100 MG tablet TID  - losartan 100 MG tablet; every day   - spironolactone  25 MG tablet; QD  - home bumex 1mg   - total lasix 40mg   - consider repeat echo     #hx PE  12/2022  #multiple DVT  -Antiphosph[facility]id [facility]   -Restart Xarelto 20mg     DVT: xarelto   FULL CODE   NOK: Toyin Musa sister [phone]

## 2023-11-15 NOTE — PROGRESS NOTES
"Lorri Sams is a 43 y.o. female on day 1 of admission presenting with Hypertensive urgency.  Met with patient to introduce myself,r ole and discuss discharge planning.  Patient lives alone.  Independent in all adl's.  Requires no assist devices for mobility.  Patient denies active home care or home care needs.  Patient denies having a PCP.  Plans to use [facility] for her medications.  Patient will need a ride home  Home Care:  N/A  DME: N/A  PCP: N/A  Pharmacy [facility]    Physical Exam    Last Recorded Vitals  Blood pressure 112/68, pulse 73, temperature 36.6 °C (97.9 °F), resp. rate 18, height 1.626 m (5' 4\"), weight 65 kg (143 lb 4.8 oz), SpO2 95 %.  Intake/Output last 3 Shifts:  I/O last 3 completed shifts:  In: - (0 mL/kg)   Out: 2550 (39.2 mL/kg) [Urine:2550 (1.1 mL/kg/hr)]  Weight: 65 kg            Assessment/Plan   Principal Problem:    Hypertensive urgency        Sunita Pelaez RN      "

## 2023-11-15 NOTE — PROGRESS NOTES
Pharmacy Medication History Review    Lorri Sams is a 43 y.o. female admitted for Hypertensive urgency. Pharmacy reviewed the patient's [id number] medications and allergies for accuracy.    The list below reflects the updated PTA list. Please review each medication in order reconciliation for additional clarification and justification.  Medications Prior to Admission   Medication Sig Dispense Refill Last Dose    amLODIPine (Norvasc) 10 mg tablet Take 1 tablet (10 mg) by mouth once daily.   11/14/2023 at 2000    bumetanide (Bumex) 1 mg tablet Take 1 tablet (1 mg) by mouth once daily. Take additional tablet if weight gain of 3+ lbs   11/14/2023    carvedilol (Coreg) 25 mg tablet Take 1 tablet (25 mg) by mouth twice a day.   Unknown    hydrALAZINE (Apresoline) 100 mg tablet Take 1 tablet (100 mg) by mouth 3 times a day.   11/14/2023 at 2000    ibuprofen 200 mg tablet Take 2 tablets (400 mg) by mouth every 6 hours if needed for headaches or mild pain (1 - 3).   11/14/2023 at 0800    losartan (Cozaar) 100 mg tablet Take 1 tablet (100 mg) by mouth once daily.   Past Month    spironolactone (Aldactone) 25 mg tablet Take 1 tablet (25 mg) by mouth once daily.   Unknown        The list below reflectives the updated allergy list. Please review each documented allergy for additional clarification and justification.  Allergies  Reviewed by Yas Bain, PharmD on 11/15/2023   No Known Allergies       Sources used: Pharmacy dispense history, called [facility] pharmacy [phone]) OARRs, patient interview (knew some names of drugs and frequency- unaware of doses), and internal medicine note from 1/27/23    Below are additional concerns with the patient's PTA list.  -pt states that she has not taken a blood thinner for over a year (thinks it's eliquis). Last dispensed DOAC- xarelto 20 mg daily on 1/27. Pt states she had a history of a clot.  -Called [facility] pharmacy. Per pharmacy last dispensed with Meds to  Beds in June for 30 day supply except for losartan (90 day supply)    Yas Bain, PharmD  Transitions of Care Pharmacist  Medication reconciliation complete  Please reach out via [employer] secure chat for questions, or if no response call [facility] or [facility]

## 2023-11-15 NOTE — H&P
History Of Present Illness  Lorri Sams is a 43 y.o. female presenting with hypertensive emergency and HFpEF (2022 EF 50%), PE 12/2022 presenting with SOB, chest pain, and leg swelling. Patient states that she ran out of her home medications ~2 weeks ago due to inability to get to [facility] to pick them up. Since that time, she noticed increased swelling in her LLE, but over the past 2-3 days, she also started noticing more shortness of breath as well as cough productive of white/clear sputum. She feels much improved at the time of interview, patient was s/p NTG drip, lasix, and BiPAP in the ED. She denies fevers, chills, abdominal pain, nausea, vomiting. Of note, patient was hospitalized for ADHF in 12/2022 at [facility], and she says that she felt today the same as when she was hospitalized then.      She also states that she has been off of her anticoagulation since her hospitalization in 12/2022. Patient has significant history of DVT and PE. Patient reports that she had her first DVT in 2003 (not provoked) but was not started on anticoagulation until ~2016 when she had another DVT. Both of those thromboses were in her legs. She was also found to have DVT and PE in 12/2022 when she was admitted to [facility], but patient states she was not sent with any anticoagulation at that time (discharge summary from that admission does have Xarelto listed as a medication). Patient has reportedly been off of anticoagulation for the last 11 months. On chart review she was placed on AC during several hospitalizations since 2018 for Familial Hypercoagulability and unprovoked clotting.    Upon arriving to the floor she is stable and reports no new complaints or recurrance of sxs. She denies CP/SOB/N/V/HA at this time. She believes the sxs have resolved since getting back on her heart medications.     Past Medical History  Past Medical History:   Diagnosis Date    Acute pharyngitis, unspecified     Sore throat    Adjustment disorder,  unspecified     Hospitalism    Deep phlebothrombosis in pregnancy, unspecified trimester     DVT (deep vein thrombosis) in pregnancy    Dorsopathy, unspecified     Back problem    Encounter for screening for malignant neoplasm of vagina     Vaginal Pap smear    Essential (primary) hypertension 2013    Hypertension    Headache, unspecified 2013    Headache    Other conditions influencing health status     History of pregnancy    Personal history of other complications of pregnancy, childbirth and the puerperium     History of spontaneous     Personal history of other diseases of the respiratory system     History of upper respiratory infection    Personal history of other diseases of the respiratory system     History of pharyngitis    Personal history of other diseases of the respiratory system     History of allergic rhinitis    Streptococcal infection, unspecified site     Streptococcus group B infection       Surgical History  Past Surgical History:   Procedure Laterality Date     SECTION, LOW TRANSVERSE  2014     Section Low Transverse        Social History  She reports that she has been smoking cigars and cigarettes. She has been smoking an average of 1 pack per day. She does not have any smokeless tobacco history on file. She reports that she does not currently use alcohol. She reports that she does not currently use drugs.    Family History  No family history on file.     Allergies  Patient has no known allergies.    Review of Systems     Physical Exam  Constitutional:       Appearance: Normal appearance.   HENT:      Head: Normocephalic and atraumatic.      Mouth/Throat:      Mouth: Mucous membranes are moist.   Eyes:      Extraocular Movements: Extraocular movements intact.      Pupils: Pupils are equal, round, and reactive to light.   Cardiovascular:      Rate and Rhythm: Normal rate and regular rhythm.      Pulses: Normal pulses.      Heart sounds: Murmur heard.  "  Pulmonary:      Effort: Pulmonary effort is normal.      Breath sounds: Normal breath sounds.   Abdominal:      General: Bowel sounds are normal.      Palpations: Abdomen is soft.   Skin:     General: Skin is warm and dry.      Capillary Refill: Capillary refill takes less than 2 seconds.   Neurological:      General: No focal deficit present.      Mental Status: She is alert and oriented to person, place, and time.   Psychiatric:         Mood and Affect: Mood normal.         Behavior: Behavior normal.        Last Recorded Vitals  Blood pressure (!) 154/94, pulse 78, temperature 36.9 °C (98.4 °F), resp. rate 18, height 1.626 m (5' 4\"), weight 65 kg (143 lb 4.8 oz), SpO2 94 %.       Assessment/Plan   Principal Problem:    Hypertensive urgency  Ms. Lorri Sams is a 43 y.o. 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 Pt's BP on admission was >200/100, and she was resumed on her home antihypertensives as well as started on nitroglycerin drip and BiPAP. Oxygenation improved and BP has been more controlled, now off the NTG drip. Did have a trop elevation on admission, 111>72, likely in setting of hypertensive emergency. Patient was also given 20mg IV lasix in ED w/ good urine output. On Arrival to floor pt is stable and without any new complaints.      Acute decompensated HFpEF  - Likely in the setting of not taking any of her medications over the past 2-3 weeks  - BP on admission 210/110, now s/p nitroglycerin drip and resuming her home antihypertensive medications; did require BiPAP and lasix 20mg IV in ED  - Holding home daily amlodipine 10mg and Hydralazine 100 TID in the setting of (+) orthostatics and Low BP  - Continue home losartan 100mg daily  - Lasix 20mg IV again tonight (patient on bumex " 1mg at home previously)  - Continue home coreg 25mg BID  - Continue home spironolactone 25mg daily   - Last TTE 12/2022, consider repeat this admission vs outpatient  - Previously saw cardiology at [facility], plan in 1/2023 was to workup resistant HTN as well as hypercoagulable state, but patient hasn't been seen more recently than that   - Patient will likely need to be set up with home medication delivery upon discharge (Meds to Beds aware)  - f/u echo 11/15     2. History of recurrent DVT/PE   :: Patient with multiple previous venous thrombotic events; reports history of TIA 2016  :: States she had been told previously by a doctor at  that she would need lifelong anticoagulation (no vasc med note in [facility])  :: Familial Clotting Disorder and long history of AC while hospitalized listed per Chart review concern for unprovoked PE.  - Per patient, anticoagulation discontinued on discharge from OSH in 12/2022, has not been taking xarelto since then per report  - Anticardiolipin and Beta 2 glycoprotein testing previously negative at [facility] 12/2022  - Resume Xarelto 20mg daily at this time, no concern for acute PE/DVT and need to load.      3. Tobacco use disorder  - Patient smokes 2 Black and milds per day  - Nicotine patch PRN, not currently ordered     F: PRN  E: PRN  N: Low Na diet  A: PIV  GI Ppx: N/a  DVT ppx: xarelto, SCDs     Code Status: Full code (confirmed on admission)  NOK: Sister Toyin Musa [phone]       Qasim Maza MD  Internal Medicine PGY-1

## 2023-11-15 NOTE — H&P
History Of Present Illness  Lorri Sams is a 43 y.o. female w/ presenting with hypertensive emergency and HFpEF ( EF 50%), PE 2022 presenting with SOB, chest pain, and leg swelling. Patient states that she ran out of her home medications ~2 weeks ago due to inability to get to [facility] [place] to pick them up. Since that time, she noticed increased swelling in her LLE, but over the past 2-3 days, she also started noticing more shortness of breath as well as cough productive of white/clear sputum. She feels much improved at the time of interview, patient was s/p NTG drip, lasix, and BiPAP in the ED. She denies fevers, chills, abdominal pain, nausea, vomiting. Of note, patient was hospitalized for ADHF in 2022 at [facility], and she says that she felt today the same as when she was hospitalized then.     She also states that she has been off of her anticoagulation since her hospitalization in 2022. Patient has significant history of DVT and PE. Patient reports that she had her first DVT in  (not provoked) but was not started on anticoagulation until ~ when she had another DVT. Both of those thromboses were in her legs. She was also found to have DVT and PE in 2022 when she was admitted to [facility], but patient states she was not sent with any anticoagulation at that time (discharge summary from that admission does have Xarelto listed as a medication). Patient has reportedly been off of anticoagulation for the last 11 months.     Prior imagin/2022 TTE:   Focally abnormal LV systolic function.   Focal LV systolic dysfunction consists of akinesia of the mid   inferolateral, basal inferolateral, basal inferior wall, mid inferior   wall.   The left ventricular ejection fraction (LVEF) is 50%.   Diastolic LV function assessed by resting Doppler is abnormal.   Globally abnormal RV systolic function.   Dilated left ventricle, left atrium, right ventricle.   Mixed (concentric and eccentric) left ventricular  hypertrophy is present.   Moderate aortic, mitral valve regurgitation.   Mild tricuspid valve regurgitation.   Noninvasive hemodynamic assessment is consistent with severe pulmonary   hypertension (>60 mmHg), an elevated CVP, an elevated LVEDP.    In the emergency department:   - 210s/110s. EKG initially displayed T wave inversions of the inferior leads. Repeat EKG had improved T wave inversions. Initial troponin was 111 and repeat was 72. BNP was significantly elevated at 1485. Patient initiated on nitro, BiPAP, lasix 20mg IV, and home antihypertensives. BiPAP was discontinued. Patient is now on room air. BP is stable at 158/100.         Past Medical History  Past Medical History:   Diagnosis Date    Acute pharyngitis, unspecified     Sore throat    Adjustment disorder, unspecified     Hospitalism    Deep phlebothrombosis in pregnancy, unspecified trimester     DVT (deep vein thrombosis) in pregnancy    Dorsopathy, unspecified     Back problem    Encounter for screening for malignant neoplasm of vagina     Vaginal Pap smear    Essential (primary) hypertension 2013    Hypertension    Headache, unspecified 2013    Headache    Other conditions influencing health status     History of pregnancy    Personal history of other complications of pregnancy, childbirth and the puerperium     History of spontaneous     Personal history of other diseases of the respiratory system     History of upper respiratory infection    Personal history of other diseases of the respiratory system     History of pharyngitis    Personal history of other diseases of the respiratory system     History of allergic rhinitis    Streptococcal infection, unspecified site     Streptococcus group B infection       Surgical History  Past Surgical History:   Procedure Laterality Date     SECTION, LOW TRANSVERSE  2014     Section Low Transverse        Social History  She reports that she has been smoking cigars  and cigarettes. She has been smoking an average of 1 pack per day. She does not have any smokeless tobacco history on file. She reports that she does not currently use alcohol. She reports that she does not currently use drugs.    Family History  No family history on file.     Allergies  Patient has no known allergies.    Review of Systems   Constitutional:  Negative for chills, diaphoresis, fatigue and fever.   HENT:  Negative for sneezing and sore throat.    Respiratory:  Positive for cough and shortness of breath. Negative for chest tightness.    Cardiovascular:  Positive for chest pain and leg swelling.   Gastrointestinal:  Positive for abdominal distention. Negative for abdominal pain, blood in stool, constipation, diarrhea, nausea and vomiting.   Endocrine: Negative for polydipsia and polyuria.   Genitourinary:  Negative for difficulty urinating, dysuria, hematuria and urgency.   Musculoskeletal:  Negative for arthralgias, back pain and joint swelling.   Skin:  Negative for color change, pallor and rash.   Neurological:  Positive for headaches. Negative for syncope, light-headedness and numbness.   Hematological:  Does not bruise/bleed easily.   Psychiatric/Behavioral:  Negative for agitation, behavioral problems and confusion.         Physical Exam  Constitutional:       General: She is not in acute distress.     Appearance: Normal appearance. She is not toxic-appearing.   HENT:      Head: Normocephalic and atraumatic.      Mouth/Throat:      Mouth: Mucous membranes are moist.      Pharynx: Oropharynx is clear.   Eyes:      General: No scleral icterus.     Conjunctiva/sclera: Conjunctivae normal.      Pupils: Pupils are equal, round, and reactive to light.   Cardiovascular:      Rate and Rhythm: Normal rate and regular rhythm.      Pulses: Normal pulses.      Heart sounds: Normal heart sounds. No murmur heard.     No friction rub. No gallop.   Pulmonary:      Effort: Pulmonary effort is normal. No respiratory  "distress.      Breath sounds: Normal breath sounds. No wheezing or rhonchi.      Comments: Bilateral basal fine crackles noted.   Abdominal:      General: Abdomen is flat. Bowel sounds are normal. There is no distension.      Palpations: Abdomen is soft.      Tenderness: There is no abdominal tenderness. There is no guarding.   Musculoskeletal:         General: Swelling (trace bilateral pitting edema) present. Normal range of motion.      Cervical back: Normal range of motion.   Skin:     General: Skin is warm and dry.      Capillary Refill: Capillary refill takes less than 2 seconds.      Coloration: Skin is not jaundiced.   Neurological:      General: No focal deficit present.      Mental Status: She is alert and oriented to person, place, and time.      Cranial Nerves: No cranial nerve deficit.      Motor: No weakness.   Psychiatric:         Mood and Affect: Mood normal.         Thought Content: Thought content normal.          Last Recorded Vitals  Blood pressure 165/67, pulse 85, temperature 37 °C (98.6 °F), resp. rate (!) 30, height 1.626 m (5' 4\"), weight 65 kg (143 lb 4.8 oz), SpO2 98 %.    Relevant Results  Scheduled medications  [START ON 11/15/2023] amLODIPine, 10 mg, oral, Daily  [START ON 11/15/2023] carvedilol, 25 mg, oral, BID with meals  hydrALAZINE, 100 mg, oral, TID  [START ON 11/15/2023] losartan, 100 mg, oral, Daily  polyethylene glycol, 17 g, oral, Daily  rivaroxaban, 20 mg, oral, Daily with evening meal  sennosides, 2 tablet, oral, BID  [START ON 11/15/2023] spironolactone, 25 mg, oral, Daily      Continuous medications     PRN medications  PRN medications: oxygen    Results for orders placed or performed during the hospital encounter of 11/14/23 (from the past 24 hour(s))   Blood Gas Venous Full Panel Unsolicited   Result Value Ref Range    POCT pH, Venous 7.45 (H) 7.33 - 7.43 pH    POCT pCO2, Venous 35 (L) 41 - 51 mm Hg    POCT pO2, Venous 55 (H) 35 - 45 mm Hg    POCT SO2, Venous 76 (H) 45 - " 75 %    POCT Oxy Hemoglobin, Venous 72.7 45.0 - 75.0 %    POCT Hematocrit Calculated, Venous 38.0 36.0 - 46.0 %    POCT Sodium, Venous 135 (L) 136 - 145 mmol/L    POCT Potassium, Venous 4.1 3.5 - 5.3 mmol/L    POCT Chloride, Venous 105 98 - 107 mmol/L    POCT Ionized Calicum, Venous 1.19 1.10 - 1.33 mmol/L    POCT Glucose, Venous 117 (H) 74 - 99 mg/dL    POCT Lactate, Venous 1.8 0.4 - 2.0 mmol/L    POCT Base Excess, Venous 0.7 -2.0 - 3.0 mmol/L    POCT HCO3 Calculated, Venous 24.3 22.0 - 26.0 mmol/L    POCT Hemoglobin, Venous 12.8 12.0 - 16.0 g/dL    POCT Anion Gap, Venous 10.0 10.0 - 25.0 mmol/L    Patient Temperature 37.0 degrees Celsius   CBC and Auto Differential   Result Value Ref Range    WBC 7.1 4.4 - 11.3 x10*3/uL    nRBC 0.0 0.0 - 0.0 /100 WBCs    RBC 4.30 4.00 - 5.20 x10*6/uL    Hemoglobin 12.2 12.0 - 16.0 g/dL    Hematocrit 36.9 36.0 - 46.0 %    MCV 86 80 - 100 fL    MCH 28.4 26.0 - 34.0 pg    MCHC 33.1 32.0 - 36.0 g/dL    RDW 14.6 (H) 11.5 - 14.5 %    Platelets 252 150 - 450 x10*3/uL    Neutrophils % 58.7 40.0 - 80.0 %    Immature Granulocytes %, Automated 0.1 0.0 - 0.9 %    Lymphocytes % 30.6 13.0 - 44.0 %    Monocytes % 8.9 2.0 - 10.0 %    Eosinophils % 1.0 0.0 - 6.0 %    Basophils % 0.7 0.0 - 2.0 %    Neutrophils Absolute 4.16 1.20 - 7.70 x10*3/uL    Immature Granulocytes Absolute, Automated 0.01 0.00 - 0.70 x10*3/uL    Lymphocytes Absolute 2.17 1.20 - 4.80 x10*3/uL    Monocytes Absolute 0.63 0.10 - 1.00 x10*3/uL    Eosinophils Absolute 0.07 0.00 - 0.70 x10*3/uL    Basophils Absolute 0.05 0.00 - 0.10 x10*3/uL   Comprehensive metabolic panel   Result Value Ref Range    Glucose 106 (H) 74 - 99 mg/dL    Sodium 139 136 - 145 mmol/L    Potassium 4.2 3.5 - 5.3 mmol/L    Chloride 106 98 - 107 mmol/L    Bicarbonate 22 21 - 32 mmol/L    Anion Gap 15 10 - 20 mmol/L    Urea Nitrogen 21 6 - 23 mg/dL    Creatinine 1.14 (H) 0.50 - 1.05 mg/dL    eGFR 62 >60 mL/min/1.73m*2    Calcium 8.9 8.6 - 10.6 mg/dL    Albumin  3.3 (L) 3.4 - 5.0 g/dL    Alkaline Phosphatase 175 (H) 33 - 110 U/L    Total Protein 7.0 6.4 - 8.2 g/dL    AST 30 9 - 39 U/L    Bilirubin, Total 1.1 0.0 - 1.2 mg/dL    ALT 32 7 - 45 U/L   B-Type Natriuretic Peptide   Result Value Ref Range    BNP 1,485 (H) 0 - 99 pg/mL   Magnesium   Result Value Ref Range    Magnesium 1.55 (L) 1.60 - 2.40 mg/dL   Troponin I, High Sensitivity, Initial   Result Value Ref Range    Troponin I, High Sensitivity 111 (H) 0 - 34 ng/L   Light Blue Top   Result Value Ref Range    Extra Tube Hold for add-ons.    Red Top   Result Value Ref Range    Extra Tube Hold for add-ons.    Troponin I, High Sensitivity   Result Value Ref Range    Troponin I, High Sensitivity 75 (H) 0 - 34 ng/L          Assessment/Plan   Ms. Lorri Sams is a 43 y.o. female w/ PMHx of hypertension, HFpEF (low normal EF 12/2022 at [facility]), recurrent DVT/PE previously on anticoagulation but off since 12/2022 for unclear reason, tobacco use disorder, who presented to the ED w/ hypertensive emergency, AHRF, and troponin elevation. Pt's BP on admission was >200/100, and she was resumed on her home antihypertensives as well as started on nitroglycerin drip and BiPAP. Oxygenation improved and BP has been more controlled, now off the NTG drip. Did have a trop elevation on admission, 111>72, likely in setting of hypertensive emergency. Patient was also given 20mg IV lasix in ED w/ good urine output.     Acute decompensated HFpEF  Hypertensive emergency  - Likely in the setting of not taking any of her medications over the past 2-3 weeks  - BP on admission 210/110, now s/p nitroglycerin drip and resuming her home antihypertensive medications; did require BiPAP and lasix 20mg IV in ED  - Continue home amlodipine 10mg daily  - Continue home losartan 100mg daily  - Lasix 20mg IV again tonight (patient on bumex 1mg at home previously)  - Continue home coreg 25mg BID  - Continue home spironolactone 25mg daily   - Last TTE 12/2022,  consider repeat this admission vs outpatient  - Previously saw cardiology at [facility], plan in 1/2023 was to workup resistant HTN as well as hypercoagulable state, but patient hasn't been seen more recently than that   - Patient will likely need to be set up with home medication delivery upon discharge     2. History of recurrent DVT/PE    - Patient with multiple previous venous thrombotic events; reports history of TIA 2016  - States she had been told previously by a doctor at  that she would need lifelong anticoagulation (no vasc med note in [facility])  - Per patient, anticoagulation discontinued on discharge from OSH in 12/2022, has not been taking xarelto since then per report  - Anticardiolipin and Beta 2 glycoprotein testing previously negative at [facility] 12/2022  - Will resume Xarelto 20mg daily at this time, no concern for acute PE/DVT and need to load.     3. Tobacco use disorder  - Patient smokes 2 Black and milds per day  - Nicotine patch PRN, not currently ordered      F: PRN  E: PRN  N: Low Na diet  A: PIV  GI Ppx: N/a  DVT ppx: xarelto, SCDs    Code Status: Full code (confirmed on admission)  NOK: Sister Toyin Musa [phone]    Kyle Rodríguez MD  Internal Medicine PGY-1

## 2023-11-16 ENCOUNTER — PHARMACY VISIT (OUTPATIENT)
Dept: PHARMACY | Facility: CLINIC | Age: 43
End: 2023-11-16
Payer: MEDICAID

## 2023-11-16 ENCOUNTER — APPOINTMENT (OUTPATIENT)
Dept: CARDIOLOGY | Facility: HOSPITAL | Age: 43
End: 2023-11-16
Payer: COMMERCIAL

## 2023-11-16 LAB
ALBUMIN SERPL BCP-MCNC: 2.8 G/DL (ref 3.4–5)
ANION GAP SERPL CALC-SCNC: 14 MMOL/L (ref 10–20)
AORTIC VALVE MEAN GRADIENT: 24
AORTIC VALVE PEAK VELOCITY: 3.08
AV PEAK GRADIENT: 37.9
AVA (PEAK VEL): 1.5
AVA (VTI): 1.47
BASOPHILS # BLD AUTO: 0.04 X10*3/UL (ref 0–0.1)
BASOPHILS NFR BLD AUTO: 0.5 %
BUN SERPL-MCNC: 24 MG/DL (ref 6–23)
CALCIUM SERPL-MCNC: 9 MG/DL (ref 8.6–10.6)
CHLORIDE SERPL-SCNC: 103 MMOL/L (ref 98–107)
CO2 SERPL-SCNC: 24 MMOL/L (ref 21–32)
CREAT SERPL-MCNC: 1.33 MG/DL (ref 0.5–1.05)
EJECTION FRACTION APICAL 4 CHAMBER: 61
EJECTION FRACTION: 60
EOSINOPHIL # BLD AUTO: 0.12 X10*3/UL (ref 0–0.7)
EOSINOPHIL NFR BLD AUTO: 1.6 %
ERYTHROCYTE [DISTWIDTH] IN BLOOD BY AUTOMATED COUNT: 14.6 % (ref 11.5–14.5)
GFR SERPL CREATININE-BSD FRML MDRD: 51 ML/MIN/1.73M*2
GLUCOSE BLD MANUAL STRIP-MCNC: 169 MG/DL (ref 74–99)
GLUCOSE SERPL-MCNC: 100 MG/DL (ref 74–99)
HCT VFR BLD AUTO: 37.8 % (ref 36–46)
HGB BLD-MCNC: 12.5 G/DL (ref 12–16)
IMM GRANULOCYTES # BLD AUTO: 0.02 X10*3/UL (ref 0–0.7)
IMM GRANULOCYTES NFR BLD AUTO: 0.3 % (ref 0–0.9)
LEFT ATRIUM VOLUME AREA LENGTH INDEX BSA: 52.2
LEFT VENTRICLE INTERNAL DIMENSION DIASTOLE: 4.9 (ref 3.5–6)
LEFT VENTRICULAR OUTFLOW TRACT DIAMETER: 2
LYMPHOCYTES # BLD AUTO: 2.96 X10*3/UL (ref 1.2–4.8)
LYMPHOCYTES NFR BLD AUTO: 40.1 %
MAGNESIUM SERPL-MCNC: 2.05 MG/DL (ref 1.6–2.4)
MCH RBC QN AUTO: 28.4 PG (ref 26–34)
MCHC RBC AUTO-ENTMCNC: 33.1 G/DL (ref 32–36)
MCV RBC AUTO: 86 FL (ref 80–100)
MONOCYTES # BLD AUTO: 0.78 X10*3/UL (ref 0.1–1)
MONOCYTES NFR BLD AUTO: 10.6 %
NEUTROPHILS # BLD AUTO: 3.46 X10*3/UL (ref 1.2–7.7)
NEUTROPHILS NFR BLD AUTO: 46.9 %
NRBC BLD-RTO: 0 /100 WBCS (ref 0–0)
PHOSPHATE SERPL-MCNC: 4.2 MG/DL (ref 2.5–4.9)
PLATELET # BLD AUTO: 246 X10*3/UL (ref 150–450)
POTASSIUM SERPL-SCNC: 3.5 MMOL/L (ref 3.5–5.3)
RBC # BLD AUTO: 4.4 X10*6/UL (ref 4–5.2)
RIGHT VENTRICLE FREE WALL PEAK S': 10.6
RIGHT VENTRICLE PEAK SYSTOLIC PRESSURE: 44
SODIUM SERPL-SCNC: 137 MMOL/L (ref 136–145)
TRICUSPID ANNULAR PLANE SYSTOLIC EXCURSION: 1.8
WBC # BLD AUTO: 7.4 X10*3/UL (ref 4.4–11.3)

## 2023-11-16 PROCEDURE — 36415 COLL VENOUS BLD VENIPUNCTURE: CPT

## 2023-11-16 PROCEDURE — 80069 RENAL FUNCTION PANEL: CPT

## 2023-11-16 PROCEDURE — 83735 ASSAY OF MAGNESIUM: CPT

## 2023-11-16 PROCEDURE — 2500000001 HC RX 250 WO HCPCS SELF ADMINISTERED DRUGS (ALT 637 FOR MEDICARE OP)

## 2023-11-16 PROCEDURE — 82947 ASSAY GLUCOSE BLOOD QUANT: CPT

## 2023-11-16 PROCEDURE — 2500000001 HC RX 250 WO HCPCS SELF ADMINISTERED DRUGS (ALT 637 FOR MEDICARE OP): Performed by: EMERGENCY MEDICINE

## 2023-11-16 PROCEDURE — 1100000001 HC PRIVATE ROOM DAILY

## 2023-11-16 PROCEDURE — 2500000001 HC RX 250 WO HCPCS SELF ADMINISTERED DRUGS (ALT 637 FOR MEDICARE OP): Performed by: STUDENT IN AN ORGANIZED HEALTH CARE EDUCATION/TRAINING PROGRAM

## 2023-11-16 PROCEDURE — 99232 SBSQ HOSP IP/OBS MODERATE 35: CPT

## 2023-11-16 PROCEDURE — 2500000004 HC RX 250 GENERAL PHARMACY W/ HCPCS (ALT 636 FOR OP/ED)

## 2023-11-16 PROCEDURE — 85025 COMPLETE CBC W/AUTO DIFF WBC: CPT

## 2023-11-16 PROCEDURE — 2500000004 HC RX 250 GENERAL PHARMACY W/ HCPCS (ALT 636 FOR OP/ED): Performed by: EMERGENCY MEDICINE

## 2023-11-16 PROCEDURE — RXMED WILLOW AMBULATORY MEDICATION CHARGE

## 2023-11-16 PROCEDURE — 93306 TTE W/DOPPLER COMPLETE: CPT | Performed by: INTERNAL MEDICINE

## 2023-11-16 PROCEDURE — 93010 ELECTROCARDIOGRAM REPORT: CPT | Performed by: INTERNAL MEDICINE

## 2023-11-16 PROCEDURE — 93306 TTE W/DOPPLER COMPLETE: CPT

## 2023-11-16 RX ORDER — AMLODIPINE BESYLATE 10 MG/1
10 TABLET ORAL DAILY
Status: DISCONTINUED | OUTPATIENT
Start: 2023-11-17 | End: 2023-11-17 | Stop reason: HOSPADM

## 2023-11-16 RX ORDER — AMLODIPINE BESYLATE 10 MG/1
10 TABLET ORAL ONCE
Status: COMPLETED | OUTPATIENT
Start: 2023-11-16 | End: 2023-11-16

## 2023-11-16 RX ORDER — HYDRALAZINE HYDROCHLORIDE 25 MG/1
25 TABLET, FILM COATED ORAL 3 TIMES DAILY
Status: DISCONTINUED | OUTPATIENT
Start: 2023-11-16 | End: 2023-11-17 | Stop reason: HOSPADM

## 2023-11-16 RX ADMIN — SENNOSIDES 17.2 MG: 8.6 TABLET, FILM COATED ORAL at 08:08

## 2023-11-16 RX ADMIN — HYDRALAZINE HYDROCHLORIDE 25 MG: 25 TABLET, FILM COATED ORAL at 20:21

## 2023-11-16 RX ADMIN — LOSARTAN POTASSIUM 100 MG: 50 TABLET, FILM COATED ORAL at 08:08

## 2023-11-16 RX ADMIN — AMLODIPINE BESYLATE 10 MG: 10 TABLET ORAL at 15:19

## 2023-11-16 RX ADMIN — CARVEDILOL 25 MG: 25 TABLET, FILM COATED ORAL at 08:08

## 2023-11-16 RX ADMIN — SPIRONOLACTONE 25 MG: 25 TABLET, FILM COATED ORAL at 08:08

## 2023-11-16 RX ADMIN — RIVAROXABAN 20 MG: 20 TABLET, FILM COATED ORAL at 16:38

## 2023-11-16 RX ADMIN — HYDRALAZINE HYDROCHLORIDE 25 MG: 25 TABLET, FILM COATED ORAL at 16:30

## 2023-11-16 RX ADMIN — POLYETHYLENE GLYCOL 3350 17 G: 17 POWDER, FOR SOLUTION ORAL at 08:08

## 2023-11-16 RX ADMIN — CARVEDILOL 25 MG: 25 TABLET, FILM COATED ORAL at 16:38

## 2023-11-16 ASSESSMENT — COGNITIVE AND FUNCTIONAL STATUS - GENERAL
MOBILITY SCORE: 24
DAILY ACTIVITIY SCORE: 24
MOBILITY SCORE: 24

## 2023-11-16 ASSESSMENT — PAIN SCALES - GENERAL
PAINLEVEL_OUTOF10: 0 - NO PAIN
PAINLEVEL_OUTOF10: 0 - NO PAIN

## 2023-11-16 NOTE — CARE PLAN
Problem: Fall/Injury  Goal: Not fall by end of shift  Outcome: Progressing  Goal: Be free from injury by end of the shift  Outcome: Progressing  Goal: Verbalize understanding of personal risk factors for fall in the hospital  Outcome: Progressing  Goal: Verbalize understanding of risk factor reduction measures to prevent injury from fall in the home  Outcome: Progressing  Goal: Use assistive devices by end of the shift  Outcome: Progressing  Goal: Pace activities to prevent fatigue by end of the shift  Outcome: Progressing     Problem: Pain  Goal: My pain/discomfort is manageable  Outcome: Progressing     Problem: Safety  Goal: Patient will be injury free during hospitalization  Outcome: Progressing  Goal: I will remain free of falls  Outcome: Progressing     Problem: Daily Care  Goal: Daily care needs are met  Outcome: Progressing     Problem: Psychosocial Needs  Goal: Demonstrates ability to cope with hospitalization/illness  Outcome: Progressing  Goal: Collaborate with me, my family, and caregiver to identify my specific goals  Outcome: Progressing     Problem: Discharge Barriers  Goal: My discharge needs are met  Outcome: Progressing       The clinical goals for the shift include maintain bp within normal range.

## 2023-11-16 NOTE — HOSPITAL COURSE
Lorri Sams is a 43 y.o. female presenting with hypertensive emergency and HFpEF (2022 EF 50%), PE 12/2022 presenting with SOB, chest pain, and leg swelling. Patient states that she ran out of her home medications ~2 weeks ago due to inability to get to [facility] to pick them up. Since that time, she noticed increased swelling in her LLE, but over the past 2-3 days, she also started noticing more shortness of breath as well as cough productive of white/clear sputum. She feels much improved at the time of interview, patient was s/p NTG drip, lasix, and BiPAP in the ED. She denies fevers, chills, abdominal pain, nausea, vomiting. Of note, patient was hospitalized for ADHF in 12/2022 at [facility], and she says that she felt today the same as when she was hospitalized then.      She also states that she has been off of her anticoagulation since her hospitalization in 12/2022. Patient has significant history of DVT and PE. Patient reports that she had her first DVT in 2003 (not provoked) but was not started on anticoagulation until ~2016 when she had another DVT. Both of those thromboses were in her legs. She was also found to have DVT and PE in 12/2022 when she was admitted to [facility], but patient states she was not sent with any anticoagulation at that time (discharge summary from that admission does have Xarelto listed as a medication). Patient has reportedly been off of anticoagulation for the last 11 months. On chart review she was placed on AC during several hospitalizations since 2018 for Familial Hypercoagulability and unprovoked clotting.     Upon arriving to the floor she is stable and reports no new complaints or recurrance of sxs. She denies CP/SOB/N/V/HA at this time. She believes the sxs have resolved since getting back on her heart medications. While on floor, BP regimen modifications made toward optimal BP control with no ensuing cardiopulmonary symptoms or events.

## 2023-11-16 NOTE — PROGRESS NOTES
"Lorri Sams is a 43 y.o. female on day 2 of admission presenting with Hypertensive urgency.    Subjective   No acute events overnight and patient remained HDS. She denies any CP/SOB/N/V/HA/Dizziness. This morning we discussed her anti coagulation and she confirms that she has a strong family history of hypercoagulability.       Objective     Physical Exam  Constitutional:       Appearance: Normal appearance.   HENT:      Head: Normocephalic and atraumatic.      Mouth/Throat:      Mouth: Mucous membranes are moist.   Eyes:      Extraocular Movements: Extraocular movements intact.      Pupils: Pupils are equal, round, and reactive to light.   Cardiovascular:      Rate and Rhythm: Normal rate and regular rhythm.      Heart sounds: Murmur heard.   Pulmonary:      Effort: Pulmonary effort is normal.      Breath sounds: Normal breath sounds.   Abdominal:      General: Bowel sounds are normal.      Palpations: Abdomen is soft.   Musculoskeletal:         General: Normal range of motion.   Skin:     General: Skin is warm and dry.   Neurological:      General: No focal deficit present.      Mental Status: She is alert and oriented to person, place, and time.   Psychiatric:         Mood and Affect: Mood normal.         Behavior: Behavior normal.         Last Recorded Vitals  Blood pressure 144/90, pulse 78, temperature 36.7 °C (98.1 °F), resp. rate 18, height 1.626 m (5' 4\"), weight 65 kg (143 lb 4.8 oz), SpO2 96 %.  Intake/Output last 3 Shifts:  I/O last 3 completed shifts:  In: - (0 mL/kg)   Out: 1700 (26.2 mL/kg) [Urine:1700 (0.7 mL/kg/hr)]  Weight: 65 kg     Relevant Results              Scheduled medications  carvedilol, 25 mg, oral, BID with meals  [Held by provider] hydrALAZINE, 100 mg, oral, TID  losartan, 100 mg, oral, Daily  perflutren lipid microspheres, 0.5-10 mL of dilution, intravenous, Once in imaging  perflutren protein A microsphere, 0.5 mL, intravenous, Once in imaging  polyethylene glycol, 17 g, oral, " Daily  rivaroxaban, 20 mg, oral, Daily with evening meal  sennosides, 2 tablet, oral, BID  spironolactone, 25 mg, oral, Daily  sulfur hexafluoride microsphr, 2 mL, intravenous, Once in imaging        Assessment/Plan   Principal Problem:    Hypertensive urgency    UPDATES 11/16:    - Restart amlodipine 10, consider Hydral 50 considering BP response.  - F/u TTE for completion and read   - Plan to continue Xeralto 20 mg at Discharge      Acute decompensated HFpEF  - Likely in the setting of not taking any of her medications over the past 2-3 weeks  - BP on admission 210/110, now s/p nitroglycerin drip and resuming her home antihypertensive medications; did require BiPAP and lasix 20mg IV in ED  - Continue home losartan 100mg daily  - Lasix 20mg IV again tonight (patient on bumex 1mg at home previously)  - Continue home coreg 25mg BID  - Continue home spironolactone 25mg daily   - Last TTE 12/2022, consider repeat this admission vs outpatient  - Previously saw cardiology at [facility], plan in 1/2023 was to workup resistant HTN as well as hypercoagulable state, but patient hasn't been seen more recently than that   - Patient will likely need to be set up with home medication delivery upon discharge (Meds to Beds aware)  - 11/15 Held home daily amlodipine 10mg and Hydralazine 100 TID in the setting of (+) orthostatics and Low BP. Restart amlo 10 today, consider hydral at 50  - F/u EVELYN 11/15     2. History of recurrent DVT/PE   :: Patient with multiple previous venous thrombotic events; reports history of TIA 2016  :: States she had been told previously by a doctor at  that she would need lifelong anticoagulation (no vasc med note in [facility])  :: Familial Clotting Disorder and long history of AC while hospitalized listed per Chart review concern for unprovoked PE.  - Per patient, anticoagulation discontinued on discharge from OSH in 12/2022, has not been taking xarelto since then per report  - Anticardiolipin and Beta 2  glycoprotein testing previously negative at [facility] 12/2022  - Cont Xarelto 20mg daily at this time, no concern for acute PE/DVT and need to load.      3. Tobacco use disorder  - Patient smokes 2 Black and milds per day  - Nicotine patch PRN, not currently ordered      F: PRN  E: PRN  N: Low Na diet  A: PIV  GI Ppx: N/a  DVT ppx: xarelto, SCDs     Code Status: Full code (confirmed on admission)  NOK: Sister Toyin Musa [phone]    Qasim Maza MD  Internal Medicine PGY-I

## 2023-11-17 ENCOUNTER — PHARMACY VISIT (OUTPATIENT)
Dept: PHARMACY | Facility: CLINIC | Age: 43
End: 2023-11-17
Payer: MEDICAID

## 2023-11-17 VITALS
BODY MASS INDEX: 24.46 KG/M2 | WEIGHT: 143.3 LBS | SYSTOLIC BLOOD PRESSURE: 143 MMHG | TEMPERATURE: 98.1 F | OXYGEN SATURATION: 96 % | HEART RATE: 87 BPM | RESPIRATION RATE: 16 BRPM | DIASTOLIC BLOOD PRESSURE: 88 MMHG | HEIGHT: 64 IN

## 2023-11-17 LAB
ALBUMIN SERPL BCP-MCNC: 3 G/DL (ref 3.4–5)
ANION GAP SERPL CALC-SCNC: 13 MMOL/L (ref 10–20)
BUN SERPL-MCNC: 21 MG/DL (ref 6–23)
CALCIUM SERPL-MCNC: 9 MG/DL (ref 8.6–10.6)
CHLORIDE SERPL-SCNC: 104 MMOL/L (ref 98–107)
CO2 SERPL-SCNC: 22 MMOL/L (ref 21–32)
CREAT SERPL-MCNC: 1.15 MG/DL (ref 0.5–1.05)
GFR SERPL CREATININE-BSD FRML MDRD: 61 ML/MIN/1.73M*2
GLUCOSE SERPL-MCNC: 96 MG/DL (ref 74–99)
PHOSPHATE SERPL-MCNC: 4.5 MG/DL (ref 2.5–4.9)
POTASSIUM SERPL-SCNC: 3.8 MMOL/L (ref 3.5–5.3)
SODIUM SERPL-SCNC: 135 MMOL/L (ref 136–145)

## 2023-11-17 PROCEDURE — RXMED WILLOW AMBULATORY MEDICATION CHARGE

## 2023-11-17 PROCEDURE — 2500000001 HC RX 250 WO HCPCS SELF ADMINISTERED DRUGS (ALT 637 FOR MEDICARE OP): Performed by: STUDENT IN AN ORGANIZED HEALTH CARE EDUCATION/TRAINING PROGRAM

## 2023-11-17 PROCEDURE — 2500000001 HC RX 250 WO HCPCS SELF ADMINISTERED DRUGS (ALT 637 FOR MEDICARE OP): Performed by: EMERGENCY MEDICINE

## 2023-11-17 PROCEDURE — 80069 RENAL FUNCTION PANEL: CPT

## 2023-11-17 PROCEDURE — 2500000001 HC RX 250 WO HCPCS SELF ADMINISTERED DRUGS (ALT 637 FOR MEDICARE OP)

## 2023-11-17 PROCEDURE — 36415 COLL VENOUS BLD VENIPUNCTURE: CPT

## 2023-11-17 PROCEDURE — 2500000004 HC RX 250 GENERAL PHARMACY W/ HCPCS (ALT 636 FOR OP/ED): Performed by: EMERGENCY MEDICINE

## 2023-11-17 PROCEDURE — 99239 HOSP IP/OBS DSCHRG MGMT >30: CPT

## 2023-11-17 RX ORDER — HYDRALAZINE HYDROCHLORIDE 25 MG/1
25 TABLET, FILM COATED ORAL 3 TIMES DAILY
Qty: 90 TABLET | Refills: 1 | Status: SHIPPED | OUTPATIENT
Start: 2023-11-17

## 2023-11-17 RX ORDER — BUMETANIDE 1 MG/1
1 TABLET ORAL DAILY
Qty: 30 TABLET | Refills: 1 | Status: SHIPPED | OUTPATIENT
Start: 2023-11-17 | End: 2024-02-02

## 2023-11-17 RX ORDER — CARVEDILOL 25 MG/1
25 TABLET ORAL
Qty: 60 TABLET | Refills: 1 | Status: SHIPPED | OUTPATIENT
Start: 2023-11-17

## 2023-11-17 RX ORDER — SPIRONOLACTONE 25 MG/1
25 TABLET ORAL
Qty: 30 TABLET | Refills: 1 | Status: SHIPPED | OUTPATIENT
Start: 2023-11-17

## 2023-11-17 RX ORDER — IBUPROFEN 200 MG
400 TABLET ORAL EVERY 6 HOURS PRN
Qty: 90 TABLET | Refills: 1 | Status: SHIPPED | OUTPATIENT
Start: 2023-11-17

## 2023-11-17 RX ORDER — LOSARTAN POTASSIUM 100 MG/1
100 TABLET ORAL
Qty: 30 TABLET | Refills: 1 | Status: SHIPPED | OUTPATIENT
Start: 2023-11-17

## 2023-11-17 RX ORDER — AMLODIPINE BESYLATE 10 MG/1
10 TABLET ORAL DAILY
Qty: 30 TABLET | Refills: 1 | Status: SHIPPED | OUTPATIENT
Start: 2023-11-17 | End: 2024-02-02

## 2023-11-17 RX ADMIN — LOSARTAN POTASSIUM 100 MG: 50 TABLET, FILM COATED ORAL at 08:51

## 2023-11-17 RX ADMIN — SPIRONOLACTONE 25 MG: 25 TABLET, FILM COATED ORAL at 08:51

## 2023-11-17 RX ADMIN — HYDRALAZINE HYDROCHLORIDE 25 MG: 25 TABLET, FILM COATED ORAL at 14:54

## 2023-11-17 RX ADMIN — AMLODIPINE BESYLATE 10 MG: 10 TABLET ORAL at 08:51

## 2023-11-17 RX ADMIN — CARVEDILOL 25 MG: 25 TABLET, FILM COATED ORAL at 08:51

## 2023-11-17 RX ADMIN — HYDRALAZINE HYDROCHLORIDE 25 MG: 25 TABLET, FILM COATED ORAL at 08:51

## 2023-11-17 ASSESSMENT — PAIN SCALES - GENERAL: PAINLEVEL_OUTOF10: 0 - NO PAIN

## 2023-11-17 NOTE — DISCHARGE INSTRUCTIONS
Papa Ms. Sams,    You initially presented were admitted to  [facility] for shortness of breath requiring oxygen assistance, chest pain and leg swelling and found to be in Hypertensive Urgency. While here we restarted you blood pressure medications and your anticoagulation medicine from home. You remained stable and without need for further acute intervention while under the floor team's care. You medications were adjusted until you were stable for discharge.    Please keep all follow up appointments.  Please take your medications as instructed.  If you have any chest pain, shortness of breath, or vision changes please visit your nearest emergency department.    We have continued your home Bumex for use as needed. Please only take this medicine on days that you notice a gain of 3 lbs from one day to the next or that you notice your legs are swelling again.    You have an appointment with:  General Care Provider  General Cardiology    If you do not receive a call to schedule appointment within one week place call the following number to make an appointment:  [phone]    Juan,  Your  Team

## 2023-11-17 NOTE — DISCHARGE SUMMARY
Discharge Diagnosis  Hypertensive urgency    Issues Requiring Follow-Up  - PCP follow up  - Cardiology Follow up    Test Results Pending At Discharge  Pending Labs       Order Current Status    Extra Tubes Preliminary result    Light Blue Top Preliminary result    Red Top Preliminary result            Hospital Course  Lorri Sams is a 43 y.o. female presenting with hypertensive emergency and HFpEF (2022 EF 50%), PE 12/2022 presenting with SOB, chest pain, and leg swelling. Patient states that she ran out of her home medications ~2 weeks ago due to inability to get to [facility] to pick them up. Since that time, she noticed increased swelling in her LLE, but over the past 2-3 days, she also started noticing more shortness of breath as well as cough productive of white/clear sputum. She feels much improved at the time of interview, patient was s/p NTG drip, lasix, and BiPAP in the ED. She denies fevers, chills, abdominal pain, nausea, vomiting. Of note, patient was hospitalized for ADHF in 12/2022 at [facility], and she says that she felt today the same as when she was hospitalized then.      She also states that she has been off of her anticoagulation since her hospitalization in 12/2022. Patient has significant history of DVT and PE. Patient reports that she had her first DVT in 2003 (not provoked) but was not started on anticoagulation until ~2016 when she had another DVT. Both of those thromboses were in her legs. She was also found to have DVT and PE in 12/2022 when she was admitted to [facility], but patient states she was not sent with any anticoagulation at that time (discharge summary from that admission does have Xarelto listed as a medication). Patient has reportedly been off of anticoagulation for the last 11 months. On chart review she was placed on AC during several hospitalizations since 2018 for Familial Hypercoagulability and unprovoked clotting.     Upon arriving to the floor she is stable and reports no new  complaints or recurrance of sxs. She denies CP/SOB/N/V/HA at this time. She believes the sxs have resolved since getting back on her heart medications. While on floor, BP regimen modifications made toward optimal BP control with no ensuing cardiopulmonary symptoms or events.    Pertinent Physical Exam At Time of Discharge  Physical Exam  Constitutional:       Appearance: Normal appearance.   HENT:      Head: Normocephalic and atraumatic.      Mouth/Throat:      Mouth: Mucous membranes are moist.   Eyes:      Extraocular Movements: Extraocular movements intact.      Pupils: Pupils are equal, round, and reactive to light.   Cardiovascular:      Rate and Rhythm: Normal rate and regular rhythm.      Pulses: Normal pulses.      Heart sounds: Murmur heard.   Pulmonary:      Effort: Pulmonary effort is normal.      Breath sounds: Normal breath sounds.   Abdominal:      General: Bowel sounds are normal.      Palpations: Abdomen is soft.   Skin:     General: Skin is warm and dry.   Neurological:      General: No focal deficit present.      Mental Status: She is alert and oriented to person, place, and time.   Psychiatric:         Mood and Affect: Mood normal.         Behavior: Behavior normal.         Home Medications     Medication List      START taking these medications     rivaroxaban 20 mg tablet; Commonly known as: Xarelto; Take 1 tablet (20   mg) by mouth once daily in the evening. Take with meals. Take with food.   Do not start before November 16, 2023.     CHANGE how you take these medications     amLODIPine 10 mg tablet; Commonly known as: Norvasc; Take 1 tablet (10   mg) by mouth once daily.; What changed: when to take this   bumetanide 1 mg tablet; Commonly known as: Bumex; Take 1 tablet (1 mg)   by mouth once daily. Take additional tablet if weight gain of 3+ lbs; What   changed: when to take this   carvedilol 25 mg tablet; Commonly known as: Coreg; Take 1 tablet (25 mg)   by mouth 2 times a day with meals.;  What changed: when to take this   hydrALAZINE 25 mg tablet; Commonly known as: Apresoline; Take 1 tablet   (25 mg) by mouth 3 times a day.; What changed: medication strength, how   much to take, when to take this     CONTINUE taking these medications     ibuprofen 200 mg tablet; Take 2 tablets (400 mg) by mouth every 6 hours   if needed for headaches or mild pain (1 - 3).   losartan 100 mg tablet; Commonly known as: Cozaar; Take 1 tablet (100   mg) by mouth once daily.   spironolactone 25 mg tablet; Commonly known as: Aldactone; Take 1 tablet   (25 mg) by mouth once daily.       Outpatient Follow-Up  - Follow up with Cardiology (to call patient for scheduling)  - Follow up with PCP at [facility] (to call patient for scheduling)    Qasim Maza MD

## 2023-11-17 NOTE — NURSING NOTE
11/17/2023 15:32 Nursing Discharge Note  AVS reviewed with patient. All questions answered. IV and tele removed. Meds to beds delivered. Pt had all belongings with them. Patient refused transport and walked off the floor.     Mariana Mays RN

## 2023-11-17 NOTE — NURSING NOTE
Pt complains of swelling in upper left arm below armpit and above armpit into shoulder. Is tender to the touch, but not presenting warm or red.  To come to bedside to assess. Also ordered an ECG

## 2023-11-17 NOTE — CARE PLAN
The patient's goals for the shift include slep rest comfort    The clinical goals for the shift include Pt will sleep a minimum of 4 hours by the end of this shift    Problem: Fall/Injury  Goal: Not fall by end of shift  Outcome: Progressing  Goal: Be free from injury by end of the shift  Outcome: Progressing  Goal: Verbalize understanding of personal risk factors for fall in the hospital  Outcome: Progressing  Goal: Verbalize understanding of risk factor reduction measures to prevent injury from fall in the home  Outcome: Progressing  Goal: Use assistive devices by end of the shift  Outcome: Progressing  Goal: Pace activities to prevent fatigue by end of the shift  Outcome: Progressing     Problem: Pain  Goal: My pain/discomfort is manageable  Outcome: Progressing     Problem: Safety  Goal: Patient will be injury free during hospitalization  Outcome: Progressing  Goal: I will remain free of falls  Outcome: Progressing     Problem: Daily Care  Goal: Daily care needs are met  Outcome: Progressing     Problem: Psychosocial Needs  Goal: Demonstrates ability to cope with hospitalization/illness  Outcome: Progressing  Goal: Collaborate with me, my family, and caregiver to identify my specific goals  Outcome: Progressing     Problem: Discharge Barriers  Goal: My discharge needs are met  Outcome: Progressing

## 2023-11-17 NOTE — CARE PLAN
The patient's goals for the shift include slep rest comfort    The clinical goals for the shift include Pt will remain HDS throughout shift      Problem: Fall/Injury  Goal: Not fall by end of shift  Outcome: Met  Goal: Be free from injury by end of the shift  Outcome: Met  Goal: Verbalize understanding of personal risk factors for fall in the hospital  Outcome: Met  Goal: Verbalize understanding of risk factor reduction measures to prevent injury from fall in the home  Outcome: Met  Goal: Use assistive devices by end of the shift  Outcome: Met  Goal: Pace activities to prevent fatigue by end of the shift  Outcome: Met     Problem: Pain  Goal: My pain/discomfort is manageable  Outcome: Met     Problem: Safety  Goal: Patient will be injury free during hospitalization  Outcome: Met  Goal: I will remain free of falls  Outcome: Met     Problem: Daily Care  Goal: Daily care needs are met  Outcome: Met     Problem: Psychosocial Needs  Goal: Demonstrates ability to cope with hospitalization/illness  Outcome: Met  Goal: Collaborate with me, my family, and caregiver to identify my specific goals  Outcome: Met     Problem: Discharge Barriers  Goal: My discharge needs are met  Outcome: Met

## 2023-11-17 NOTE — ED PROCEDURE NOTE
Procedure  Critical Care    Performed by: Brian Nichols DO  Authorized by: Darien Erickson MD    Critical care provider statement:     Critical care time (minutes):  45    Critical care time was exclusive of:  Separately billable procedures and treating other patients and teaching time    Critical care was necessary to treat or prevent imminent or life-threatening deterioration of the following conditions:  Cardiac failure and respiratory failure    Critical care was time spent personally by me on the following activities:  Blood draw for specimens, development of treatment plan with patient or surrogate, evaluation of patient's response to treatment, examination of patient, ordering and performing treatments and interventions, ordering and review of laboratory studies, ordering and review of radiographic studies, pulse oximetry, re-evaluation of patient's condition and review of old charts    I assumed direction of critical care for this patient from another provider in my specialty: no                 Brian Nichols DO  11/17/23 0845

## 2023-11-20 ENCOUNTER — HOSPITAL ENCOUNTER (OUTPATIENT)
Dept: CARDIOLOGY | Facility: HOSPITAL | Age: 43
Discharge: HOME | End: 2023-11-20
Payer: COMMERCIAL

## 2023-11-20 LAB
ATRIAL RATE: 81 BPM
P AXIS: 61 DEGREES
P OFFSET: 194 MS
P ONSET: 145 MS
PR INTERVAL: 140 MS
Q ONSET: 215 MS
QRS COUNT: 13 BEATS
QRS DURATION: 106 MS
QT INTERVAL: 412 MS
QTC CALCULATION(BAZETT): 478 MS
QTC FREDERICIA: 455 MS
R AXIS: 68 DEGREES
T AXIS: -18 DEGREES
T OFFSET: 421 MS
VENTRICULAR RATE: 81 BPM

## 2023-11-20 PROCEDURE — 93005 ELECTROCARDIOGRAM TRACING: CPT

## 2023-12-03 ENCOUNTER — PHARMACY VISIT (OUTPATIENT)
Dept: PHARMACY | Facility: CLINIC | Age: 43
End: 2023-12-03
Payer: MEDICAID

## 2023-12-05 ENCOUNTER — APPOINTMENT (OUTPATIENT)
Dept: CARDIOLOGY | Facility: HOSPITAL | Age: 43
End: 2023-12-05
Payer: COMMERCIAL

## 2023-12-13 LAB
HOLD SPECIMEN: NORMAL
HOLD SPECIMEN: NORMAL

## 2023-12-29 PROCEDURE — RXMED WILLOW AMBULATORY MEDICATION CHARGE

## 2023-12-30 PROCEDURE — RXMED WILLOW AMBULATORY MEDICATION CHARGE

## 2024-01-02 ENCOUNTER — PHARMACY VISIT (OUTPATIENT)
Dept: PHARMACY | Facility: CLINIC | Age: 44
End: 2024-01-02
Payer: MEDICAID

## 2024-01-03 ENCOUNTER — PHARMACY VISIT (OUTPATIENT)
Dept: PHARMACY | Facility: CLINIC | Age: 44
End: 2024-01-03
Payer: MEDICAID

## 2024-07-16 ENCOUNTER — APPOINTMENT (OUTPATIENT)
Dept: RADIOLOGY | Facility: HOSPITAL | Age: 44
End: 2024-07-16
Payer: COMMERCIAL

## 2024-07-16 ENCOUNTER — CLINICAL SUPPORT (OUTPATIENT)
Dept: EMERGENCY MEDICINE | Facility: HOSPITAL | Age: 44
End: 2024-07-16
Payer: COMMERCIAL

## 2024-07-16 ENCOUNTER — HOSPITAL ENCOUNTER (INPATIENT)
Facility: HOSPITAL | Age: 44
LOS: 2 days | Discharge: HOME HEALTH | End: 2024-07-18
Attending: EMERGENCY MEDICINE | Admitting: STUDENT IN AN ORGANIZED HEALTH CARE EDUCATION/TRAINING PROGRAM
Payer: COMMERCIAL

## 2024-07-16 DIAGNOSIS — I1A.0 RESISTANT HYPERTENSION: ICD-10-CM

## 2024-07-16 DIAGNOSIS — R06.02 SHORTNESS OF BREATH: ICD-10-CM

## 2024-07-16 DIAGNOSIS — J43.9 PULMONARY EMPHYSEMA, UNSPECIFIED EMPHYSEMA TYPE (MULTI): ICD-10-CM

## 2024-07-16 DIAGNOSIS — I26.99 PULMONARY EMBOLISM, UNSPECIFIED CHRONICITY, UNSPECIFIED PULMONARY EMBOLISM TYPE, UNSPECIFIED WHETHER ACUTE COR PULMONALE PRESENT (MULTI): ICD-10-CM

## 2024-07-16 DIAGNOSIS — I26.99 PULMONARY EMBOLISM, OTHER, UNSPECIFIED CHRONICITY, UNSPECIFIED WHETHER ACUTE COR PULMONALE PRESENT (MULTI): ICD-10-CM

## 2024-07-16 DIAGNOSIS — I10 ESSENTIAL HYPERTENSION: ICD-10-CM

## 2024-07-16 DIAGNOSIS — I16.0 HYPERTENSIVE URGENCY: ICD-10-CM

## 2024-07-16 DIAGNOSIS — I11.0 HYPERTENSIVE HEART DISEASE WITH HEART FAILURE (MULTI): ICD-10-CM

## 2024-07-16 DIAGNOSIS — I11.9 HYPERTENSIVE HEART DISEASE WITHOUT CONGESTIVE HEART FAILURE: ICD-10-CM

## 2024-07-16 DIAGNOSIS — S31.829A WOUND OF LEFT BUTTOCK, INITIAL ENCOUNTER: ICD-10-CM

## 2024-07-16 DIAGNOSIS — Z86.718 HISTORY OF DVT OF LOWER EXTREMITY: ICD-10-CM

## 2024-07-16 DIAGNOSIS — I50.9 ACUTE ON CHRONIC CONGESTIVE HEART FAILURE, UNSPECIFIED HEART FAILURE TYPE (MULTI): Primary | ICD-10-CM

## 2024-07-16 PROBLEM — I42.9 CARDIOMYOPATHY (MULTI): Status: ACTIVE | Noted: 2024-07-16

## 2024-07-16 PROBLEM — R79.89 ELEVATED TROPONIN: Status: ACTIVE | Noted: 2023-01-28

## 2024-07-16 PROBLEM — I21.A1 TYPE 2 MI (MYOCARDIAL INFARCTION) (MULTI): Status: ACTIVE | Noted: 2022-12-07

## 2024-07-16 PROBLEM — M25.562 LEFT KNEE PAIN: Status: ACTIVE | Noted: 2024-07-16

## 2024-07-16 PROBLEM — R42 DIZZINESS AND GIDDINESS: Status: ACTIVE | Noted: 2024-07-16

## 2024-07-16 PROBLEM — R73.03 PREDIABETES: Status: ACTIVE | Noted: 2023-01-28

## 2024-07-16 PROBLEM — Z98.51 HISTORY OF BILATERAL TUBAL LIGATION: Status: ACTIVE | Noted: 2022-12-08

## 2024-07-16 PROBLEM — I82.503 CHRONIC DEEP VEIN THROMBOSIS (DVT) OF BOTH LOWER EXTREMITIES (MULTI): Status: ACTIVE | Noted: 2024-07-16

## 2024-07-16 PROBLEM — S82.142A TIBIAL PLATEAU FRACTURE, LEFT, CLOSED, INITIAL ENCOUNTER: Status: ACTIVE | Noted: 2024-07-16

## 2024-07-16 PROBLEM — I50.30 DIASTOLIC CONGESTIVE HEART FAILURE (MULTI): Status: ACTIVE | Noted: 2022-12-07

## 2024-07-16 LAB
ALBUMIN SERPL BCP-MCNC: 3 G/DL (ref 3.4–5)
ALP SERPL-CCNC: 145 U/L (ref 33–110)
ALT SERPL W P-5'-P-CCNC: 22 U/L (ref 7–45)
ANION GAP BLDV CALCULATED.4IONS-SCNC: 12 MMOL/L (ref 10–25)
ANION GAP SERPL CALC-SCNC: 16 MMOL/L (ref 10–20)
APTT PPP: 66 SECONDS (ref 27–38)
AST SERPL W P-5'-P-CCNC: 26 U/L (ref 9–39)
ATRIAL RATE: 87 BPM
BASE EXCESS BLDV CALC-SCNC: 1.9 MMOL/L (ref -2–3)
BASOPHILS # BLD AUTO: 0.08 X10*3/UL (ref 0–0.1)
BASOPHILS NFR BLD AUTO: 1.1 %
BILIRUB SERPL-MCNC: 0.8 MG/DL (ref 0–1.2)
BNP SERPL-MCNC: 1020 PG/ML (ref 0–99)
BODY TEMPERATURE: 37 DEGREES CELSIUS
BUN SERPL-MCNC: 20 MG/DL (ref 6–23)
CA-I BLDV-SCNC: 1.19 MMOL/L (ref 1.1–1.33)
CALCIUM SERPL-MCNC: 8.2 MG/DL (ref 8.6–10.6)
CARDIAC TROPONIN I PNL SERPL HS: 138 NG/L (ref 0–34)
CARDIAC TROPONIN I PNL SERPL HS: 159 NG/L (ref 0–34)
CHLORIDE BLDV-SCNC: 101 MMOL/L (ref 98–107)
CHLORIDE SERPL-SCNC: 103 MMOL/L (ref 98–107)
CO2 SERPL-SCNC: 22 MMOL/L (ref 21–32)
CREAT SERPL-MCNC: 1.31 MG/DL (ref 0.5–1.05)
CRP SERPL-MCNC: 0.42 MG/DL
D DIMER PPP FEU-MCNC: 6572 NG/ML FEU
EGFRCR SERPLBLD CKD-EPI 2021: 52 ML/MIN/1.73M*2
EOSINOPHIL # BLD AUTO: 0.06 X10*3/UL (ref 0–0.7)
EOSINOPHIL NFR BLD AUTO: 0.8 %
ERYTHROCYTE [DISTWIDTH] IN BLOOD BY AUTOMATED COUNT: 15.9 % (ref 11.5–14.5)
GLUCOSE BLDV-MCNC: 151 MG/DL (ref 74–99)
GLUCOSE SERPL-MCNC: 139 MG/DL (ref 74–99)
HCO3 BLDV-SCNC: 26.6 MMOL/L (ref 22–26)
HCT VFR BLD AUTO: 40.2 % (ref 36–46)
HCT VFR BLD EST: 41 % (ref 36–46)
HGB BLD-MCNC: 13.6 G/DL (ref 12–16)
HGB BLDV-MCNC: 13.8 G/DL (ref 12–16)
IMM GRANULOCYTES # BLD AUTO: 0.03 X10*3/UL (ref 0–0.7)
IMM GRANULOCYTES NFR BLD AUTO: 0.4 % (ref 0–0.9)
INR PPP: 1.4 (ref 0.9–1.1)
LACTATE BLDV-SCNC: 2.2 MMOL/L (ref 0.4–2)
LIPASE SERPL-CCNC: 61 U/L (ref 9–82)
LYMPHOCYTES # BLD AUTO: 2.06 X10*3/UL (ref 1.2–4.8)
LYMPHOCYTES NFR BLD AUTO: 29.1 %
MAGNESIUM SERPL-MCNC: 2.14 MG/DL (ref 1.6–2.4)
MCH RBC QN AUTO: 29.1 PG (ref 26–34)
MCHC RBC AUTO-ENTMCNC: 33.8 G/DL (ref 32–36)
MCV RBC AUTO: 86 FL (ref 80–100)
MONOCYTES # BLD AUTO: 0.56 X10*3/UL (ref 0.1–1)
MONOCYTES NFR BLD AUTO: 7.9 %
NEUTROPHILS # BLD AUTO: 4.3 X10*3/UL (ref 1.2–7.7)
NEUTROPHILS NFR BLD AUTO: 60.7 %
NRBC BLD-RTO: 0 /100 WBCS (ref 0–0)
OXYHGB MFR BLDV: 50.7 % (ref 45–75)
P AXIS: 66 DEGREES
P OFFSET: 198 MS
P ONSET: 149 MS
PCO2 BLDV: 41 MM HG (ref 41–51)
PH BLDV: 7.42 PH (ref 7.33–7.43)
PLATELET # BLD AUTO: 215 X10*3/UL (ref 150–450)
PO2 BLDV: 37 MM HG (ref 35–45)
POTASSIUM BLDV-SCNC: 4.5 MMOL/L (ref 3.5–5.3)
POTASSIUM SERPL-SCNC: 4.7 MMOL/L (ref 3.5–5.3)
PR INTERVAL: 132 MS
PROT SERPL-MCNC: 6.6 G/DL (ref 6.4–8.2)
PROTHROMBIN TIME: 15.7 SECONDS (ref 9.8–12.8)
Q ONSET: 215 MS
QRS COUNT: 15 BEATS
QRS DURATION: 104 MS
QT INTERVAL: 394 MS
QTC CALCULATION(BAZETT): 474 MS
QTC FREDERICIA: 445 MS
R AXIS: 57 DEGREES
RBC # BLD AUTO: 4.68 X10*6/UL (ref 4–5.2)
SAO2 % BLDV: 53 % (ref 45–75)
SODIUM BLDV-SCNC: 135 MMOL/L (ref 136–145)
SODIUM SERPL-SCNC: 136 MMOL/L (ref 136–145)
T AXIS: -35 DEGREES
T OFFSET: 412 MS
VENTRICULAR RATE: 87 BPM
WBC # BLD AUTO: 7.1 X10*3/UL (ref 4.4–11.3)

## 2024-07-16 PROCEDURE — 85379 FIBRIN DEGRADATION QUANT: CPT

## 2024-07-16 PROCEDURE — 99223 1ST HOSP IP/OBS HIGH 75: CPT

## 2024-07-16 PROCEDURE — 71045 X-RAY EXAM CHEST 1 VIEW: CPT | Performed by: RADIOLOGY

## 2024-07-16 PROCEDURE — 84132 ASSAY OF SERUM POTASSIUM: CPT

## 2024-07-16 PROCEDURE — 1100000001 HC PRIVATE ROOM DAILY

## 2024-07-16 PROCEDURE — 85025 COMPLETE CBC W/AUTO DIFF WBC: CPT

## 2024-07-16 PROCEDURE — 83690 ASSAY OF LIPASE: CPT

## 2024-07-16 PROCEDURE — 99285 EMERGENCY DEPT VISIT HI MDM: CPT | Mod: 25

## 2024-07-16 PROCEDURE — 83735 ASSAY OF MAGNESIUM: CPT

## 2024-07-16 PROCEDURE — 36415 COLL VENOUS BLD VENIPUNCTURE: CPT

## 2024-07-16 PROCEDURE — 93010 ELECTROCARDIOGRAM REPORT: CPT | Performed by: EMERGENCY MEDICINE

## 2024-07-16 PROCEDURE — 2500000001 HC RX 250 WO HCPCS SELF ADMINISTERED DRUGS (ALT 637 FOR MEDICARE OP)

## 2024-07-16 PROCEDURE — 73502 X-RAY EXAM HIP UNI 2-3 VIEWS: CPT | Mod: RT

## 2024-07-16 PROCEDURE — 71275 CT ANGIOGRAPHY CHEST: CPT | Performed by: STUDENT IN AN ORGANIZED HEALTH CARE EDUCATION/TRAINING PROGRAM

## 2024-07-16 PROCEDURE — 93970 EXTREMITY STUDY: CPT

## 2024-07-16 PROCEDURE — 2500000002 HC RX 250 W HCPCS SELF ADMINISTERED DRUGS (ALT 637 FOR MEDICARE OP, ALT 636 FOR OP/ED)

## 2024-07-16 PROCEDURE — 86140 C-REACTIVE PROTEIN: CPT

## 2024-07-16 PROCEDURE — 83880 ASSAY OF NATRIURETIC PEPTIDE: CPT

## 2024-07-16 PROCEDURE — 74018 RADEX ABDOMEN 1 VIEW: CPT

## 2024-07-16 PROCEDURE — 83605 ASSAY OF LACTIC ACID: CPT

## 2024-07-16 PROCEDURE — 2550000001 HC RX 255 CONTRASTS: Mod: SE

## 2024-07-16 PROCEDURE — 99285 EMERGENCY DEPT VISIT HI MDM: CPT | Performed by: EMERGENCY MEDICINE

## 2024-07-16 PROCEDURE — 71045 X-RAY EXAM CHEST 1 VIEW: CPT

## 2024-07-16 PROCEDURE — 93971 EXTREMITY STUDY: CPT | Performed by: STUDENT IN AN ORGANIZED HEALTH CARE EDUCATION/TRAINING PROGRAM

## 2024-07-16 PROCEDURE — 73502 X-RAY EXAM HIP UNI 2-3 VIEWS: CPT | Mod: RIGHT SIDE | Performed by: STUDENT IN AN ORGANIZED HEALTH CARE EDUCATION/TRAINING PROGRAM

## 2024-07-16 PROCEDURE — 2500000004 HC RX 250 GENERAL PHARMACY W/ HCPCS (ALT 636 FOR OP/ED)

## 2024-07-16 PROCEDURE — 85610 PROTHROMBIN TIME: CPT

## 2024-07-16 PROCEDURE — 84484 ASSAY OF TROPONIN QUANT: CPT

## 2024-07-16 PROCEDURE — 93005 ELECTROCARDIOGRAM TRACING: CPT

## 2024-07-16 PROCEDURE — 71275 CT ANGIOGRAPHY CHEST: CPT

## 2024-07-16 PROCEDURE — 2500000001 HC RX 250 WO HCPCS SELF ADMINISTERED DRUGS (ALT 637 FOR MEDICARE OP): Mod: SE

## 2024-07-16 RX ORDER — AMLODIPINE BESYLATE 5 MG/1
10 TABLET ORAL ONCE
Status: COMPLETED | OUTPATIENT
Start: 2024-07-16 | End: 2024-07-16

## 2024-07-16 RX ORDER — CEPHALEXIN 500 MG/1
500 CAPSULE ORAL 3 TIMES DAILY
COMMUNITY
Start: 2024-07-03 | End: 2024-07-18 | Stop reason: HOSPADM

## 2024-07-16 RX ORDER — NALTREXONE HYDROCHLORIDE 50 MG/1
50 TABLET, FILM COATED ORAL DAILY
COMMUNITY

## 2024-07-16 RX ORDER — SPIRONOLACTONE 25 MG/1
25 TABLET ORAL ONCE
Status: COMPLETED | OUTPATIENT
Start: 2024-07-16 | End: 2024-07-16

## 2024-07-16 RX ORDER — HEPARIN SODIUM 10000 [USP'U]/100ML
0-4000 INJECTION, SOLUTION INTRAVENOUS CONTINUOUS
Status: DISCONTINUED | OUTPATIENT
Start: 2024-07-16 | End: 2024-07-17

## 2024-07-16 RX ORDER — ACETAMINOPHEN 325 MG/1
975 TABLET ORAL 3 TIMES DAILY PRN
Status: DISCONTINUED | OUTPATIENT
Start: 2024-07-16 | End: 2024-07-18 | Stop reason: HOSPADM

## 2024-07-16 RX ORDER — CEFTRIAXONE 1 G/50ML
1 INJECTION, SOLUTION INTRAVENOUS EVERY 24 HOURS
Status: DISCONTINUED | OUTPATIENT
Start: 2024-07-16 | End: 2024-07-17

## 2024-07-16 RX ORDER — HYDRALAZINE HYDROCHLORIDE 25 MG/1
25 TABLET, FILM COATED ORAL ONCE
Status: COMPLETED | OUTPATIENT
Start: 2024-07-16 | End: 2024-07-16

## 2024-07-16 RX ORDER — FUROSEMIDE 10 MG/ML
40 INJECTION INTRAMUSCULAR; INTRAVENOUS ONCE
Status: COMPLETED | OUTPATIENT
Start: 2024-07-16 | End: 2024-07-16

## 2024-07-16 RX ORDER — IBUPROFEN 600 MG/1
600 TABLET ORAL ONCE
Status: COMPLETED | OUTPATIENT
Start: 2024-07-16 | End: 2024-07-16

## 2024-07-16 RX ORDER — LOSARTAN POTASSIUM 100 MG/1
100 TABLET ORAL DAILY
Status: DISCONTINUED | OUTPATIENT
Start: 2024-07-16 | End: 2024-07-18

## 2024-07-16 RX ORDER — CARVEDILOL 25 MG/1
25 TABLET ORAL
Status: DISCONTINUED | OUTPATIENT
Start: 2024-07-16 | End: 2024-07-18

## 2024-07-16 RX ORDER — AMOXICILLIN 250 MG
1 CAPSULE ORAL NIGHTLY
Status: DISCONTINUED | OUTPATIENT
Start: 2024-07-16 | End: 2024-07-18 | Stop reason: HOSPADM

## 2024-07-16 RX ORDER — DICYCLOMINE HYDROCHLORIDE 10 MG/1
10 CAPSULE ORAL ONCE
Status: COMPLETED | OUTPATIENT
Start: 2024-07-16 | End: 2024-07-16

## 2024-07-16 RX ORDER — DICYCLOMINE HYDROCHLORIDE 10 MG/1
10 CAPSULE ORAL 4 TIMES DAILY PRN
Status: DISCONTINUED | OUTPATIENT
Start: 2024-07-16 | End: 2024-07-16

## 2024-07-16 RX ORDER — CEFTRIAXONE 1 G/50ML
1 INJECTION, SOLUTION INTRAVENOUS EVERY 24 HOURS
Status: DISCONTINUED | OUTPATIENT
Start: 2024-07-16 | End: 2024-07-16

## 2024-07-16 RX ORDER — POLYETHYLENE GLYCOL 3350 17 G/17G
17 POWDER, FOR SOLUTION ORAL DAILY
Status: DISCONTINUED | OUTPATIENT
Start: 2024-07-16 | End: 2024-07-18 | Stop reason: HOSPADM

## 2024-07-16 RX ORDER — BENZONATATE 100 MG/1
100 CAPSULE ORAL 3 TIMES DAILY PRN
Status: DISCONTINUED | OUTPATIENT
Start: 2024-07-16 | End: 2024-07-18 | Stop reason: HOSPADM

## 2024-07-16 RX ORDER — SPIRONOLACTONE 25 MG/1
25 TABLET ORAL DAILY
Status: DISCONTINUED | OUTPATIENT
Start: 2024-07-17 | End: 2024-07-18 | Stop reason: HOSPADM

## 2024-07-16 RX ORDER — AMLODIPINE BESYLATE 2.5 MG/1
10 TABLET ORAL DAILY
Status: DISCONTINUED | OUTPATIENT
Start: 2024-07-17 | End: 2024-07-18 | Stop reason: HOSPADM

## 2024-07-16 ASSESSMENT — ENCOUNTER SYMPTOMS
ENDOCRINE NEGATIVE: 1
CONSTITUTIONAL NEGATIVE: 1
NEUROLOGICAL NEGATIVE: 1
ALLERGIC/IMMUNOLOGIC NEGATIVE: 1
PSYCHIATRIC NEGATIVE: 1
COUGH: 1
CONSTIPATION: 1
MUSCULOSKELETAL NEGATIVE: 1
SHORTNESS OF BREATH: 1
HEMATOLOGIC/LYMPHATIC NEGATIVE: 1
ABDOMINAL PAIN: 1
EYES NEGATIVE: 1

## 2024-07-16 ASSESSMENT — PAIN SCALES - GENERAL
PAINLEVEL_OUTOF10: 1
PAINLEVEL_OUTOF10: 5 - MODERATE PAIN

## 2024-07-16 ASSESSMENT — PAIN - FUNCTIONAL ASSESSMENT: PAIN_FUNCTIONAL_ASSESSMENT: 0-10

## 2024-07-16 NOTE — PROGRESS NOTES
Pharmacy Medication History Review    Lorri Sams is a 44 y.o. female admitted for Shortness of breath. Pharmacy reviewed the patient's [id number] medications and allergies for accuracy.    The list below reflects the updated PTA list.   Comments regarding how patient may be taking medications differently can be found in the Admit Orders Activity  Prior to Admission Medications   Prescriptions Last Dose Informant Patient Reported?   amLODIPine (Norvasc) 10 mg tablet 2 months ago  No   Sig: Take 1 tablet (10 mg) by mouth once daily.   bumetanide (Bumex) 1 mg tablet Past Week Self No   Sig: Take 1 tablet (1 mg) by mouth once daily. Take additional tablet if weight gain of 3+ lbs   carvedilol (Coreg) 25 mg tablet Past Week Self No   Sig: Take 1 tablet (25 mg) by mouth 2 times a day with meals.   cephalexin (Keflex) 500 mg capsule 7/15/2024 Self, Other Yes   Sig: Take 1 capsule (500 mg) by mouth 3 times a day. For 10 days   hydrALAZINE (Apresoline) 25 mg tablet 2 months ago Self No   Sig: Take 1 tablet (25 mg) by mouth 3 times a day.   losartan (Cozaar) 100 mg tablet 2 months ago Self No   Sig: Take 1 tablet (100 mg) by mouth once daily.   naltrexone (Depade) 50 mg tablet New Rx Self Yes   Sig: Take 1 tablet (50 mg) by mouth once daily.   naltrexone ER (Vivitrol) injection Past Month Self Yes   Sig: Inject 4 mL (380 mg) into the muscle 1 time.   rivaroxaban (Xarelto) 20 mg tablet 2 months ago  No   Sig: Take 1 tablet (20 mg) by mouth once daily in the evening. Take with meals. Take with food. Do not start before November 16, 2023.   spironolactone (Aldactone) 25 mg tablet 2 months ago Self No   Sig: Take 1 tablet (25 mg) by mouth once daily.      Facility-Administered Medications: None        The list below reflects the updated allergy list. Please review each documented allergy for additional clarification and justification.  Allergies  Reviewed by Ade Mccann RN on 7/16/2024   No Known Allergies    "      Patient accepts M2B at discharge.   Local pharmacy: [facility] or [facility] [place], [place]     Sources:   Pt interview - familiar with medications  Dispense hx  OARRS   Call to [facility] pharmacist [phone]    Additional Comments:  Pt received a Vivtrol injection a few weeks ago to her left gluteal muscle. She reports a wound developed after the injection it was treated with oral and topical antibiotics. Neosporin and cephalexin 500 mg TID for 10 days. Pt reports about 6 capsules remain. Pt reports it continues to drain today. Pt was started on oral naltrexone 50 mg daily, this has not been picked up from the pharmacy yet.   Pt is agreeable to /[facility] home delivery pharmacy after the initial meds 2 bed fill. Pt reports there are no pharmacies close to her house. I confirmed the address on file is current.  Pt ran out of refills a few months ago.       Gavin Oakley, PharmD  Transitions of Care Pharmacist  07/16/24     Secure Chat preferred   If no response call [id number] or Vocera \"Med Rec\"   "

## 2024-07-16 NOTE — SIGNIFICANT EVENT
SENIOR STAFFING NOTE    History of Present Illness  Lorri Sams is a 44 y.o. female with PMHx of HFpEF (TTE LVEF 60-65%11/2023), PE 12/2022, TIA (2016) and CKD Stage 3a(Baseline Cr.~1.1-12) who presents to [facility] with abdominal pain and SOB.     Patient reportedly ran out of all of her medications 2 months ago. Since then she reported increasing SOB over the past 2 months as well as SOB, PND and Orthopnea. She also endorsed productive cough with greenish for the past few months.  She stated that she tried to make an appointment with  Provider but, she has been able to get in contact with the office of the provider. Patient also endorsed abdominal pain that started yesterday. She stated that the pain is in the lower quadrant of abdomen, non-radiating, episodic. She reported that she has had this pain before after consuming milk products in the past which she did have a milk product the day prior. She also stated the pain was associated with constipation with her last bowel movement being 2 days ago. Patient also reported right gluteal ulceration that started a few weeks ago after Vitriol injection for Alcohol Use disorder, she stated her last drink was in 4/2024. However after receiving the second injection of this medication she noticed the wound increasing size with worsening purulent drainage. She was written for a prescription of Cephalexin for 10 days which she has been taking. She otherwise denied fever, chills, chest pain, N/V or any other symptoms. In ED, she was found to be hypoxic 89% and Hypertensive to the 187/103. Labwork was notable for troponemia of 159->138, BNP1,020, Lipase 61, D-Dimer 6,572 and VBG Ph 7.42 , PCO2 41 , PO2 37, Lactate 2.2 and CMP w/ elevated Cr. 1.31(Baseline ~1.1-1.2). Given elevated D-Dimer, CT-PE was ordered and was negative for PE but, did note mild pulmonary edema and enlarged mediastinal lymph nodes. CXR noted right lower lobe opacity which could represent infectious vs  atelectasis changes. DVT U/S noted left posterior tibial occlusive thrombus and chronic left femoral vein thrombus. Patient was given Hydralazine 25mg, Amlodipine 10mg Spironolactone 25mg, Ibuprofen 600mg, dicylomine 10mg, IV Lasix 20mg and Rivoraxban 20mg.      Notably, patient was hospitalized in 11/2023 for Hypertensive Emergency and ADHF which was related to running out of her medications. She was diuresed with lasix, required NTG drip as well as BIPAP. She was restarted on her home medications including Xarelto which had been stopped in 12/2022 but, per chart review she has a familial hypercouagulbity disorder and was told that would require lifelong anticoagulation previously.     Objective:  BP (!) 171/100   Pulse 81   Temp 36.6 °C (97.9 °F) (Temporal)   Resp 16   SpO2 99%      Labs:     CBC: WBC 7.1 , HGB 13.6,   BMP: , K 4.7, Cl 103, HCO3 22, BUN 20, CR 1.31, Glu 139  LFTS: AST 26 , ALT 22, ALKPHOS 145 , TBILI 0.8   TROP: 138  BNP: 1,020  COAGS: PT 11.3 , PTT 43  , INR 1.0    EKG  NSR, LVH, , Vent rate 87    Imaging:  Vascular US lower extremity venous duplex bilateral    Result Date: 7/16/2024  Interpreted By:  Simon Bey, STUDY: [facility] US LOWER EXTREMITY VENOUS DUPLEX BILATERAL;  7/16/2024 2:55 pm   INDICATION: Signs/Symptoms:elevated d-dimer, nodule on left leg.   COMPARISON: Venous duplex ultrasound bilateral lower extremity 03/07/2018   ACCESSION NUMBER(S): [id number]   ORDERING CLINICIAN: KHARI HERNANDEZ   TECHNIQUE: Vascular ultrasound of the bilateral lower extremities was performed. Real-time compression views as well as Gray scale, color Doppler and spectral Doppler waveform analysis was performed.   FINDINGS: Evaluation of the visualized portions of the bilateral common femoral vein, proximal, mid, and distal femoral vein, and popliteal vein were performed.  Evaluation of the visualized portions of the  posterior tibial and peroneal veins were also performed.   Limitations:   None.   The left posterior tibial vein is noncompressible and demonstrates lack of flow on color Doppler, consistent with occlusive thrombosis. There is adherent echogenic debris along the wall of the left common femoral vein with preserved flow and compressibility consistent with partial thrombus. Findings were seen on prior venous ultrasound 03/07/2018. The remaining evaluated veins demonstrate normal compressibility. There is intact venous flow demonstrating normal respiratory variability and normal augmentation of flow with calf compression. Respiratory variation and augmentation to calf pressure was noted.       1.  Left posterior tibial vein occlusive thrombosis. 2. Chronic left common femoral vein partial thrombus seen on prior ultrasound 03/07/2018. 3. No sonographic evidence for deep vein thrombosis within the evaluated veins of the right lower extremity.   I personally reviewed the images/study and I agree with Dr. Simon Bey findings as stated. This study was interpreted at [place], [place]   MACRO: Critical Finding:  See findings. Notification was initiated on 7/16/2024 at 3:31 pm by  Simon Bey.  (**-YCF-**) Instructions:     Dictation workstation:   [id number]    CT angio chest for pulmonary embolism    Result Date: 7/16/2024  Interpreted By:  Timo Doe and Ohs Zachary STUDY: CT ANGIO CHEST FOR PULMONARY EMBOLISM;  7/16/2024 12:18 pm   INDICATION: Signs/Symptoms:elevated dimer, off anticoagulant. Per EMR, 44-year-old female, former smoker, with history of DVT (previously described rivaroxaban, off for 2 months), diastolic CHF, presenting with abdominal pain and being out of her heart failure medication. Additionally endorses cough/shortness of breath for several months worsened with laying flat. Mid to mild bilateral lower extremity swelling.   COMPARISON: CT chest, abdomen and pelvis 08/22/2023.   ACCESSION NUMBER(S): [id number]   ORDERING  CLINICIAN: KHARI HERNANDEZ   TECHNIQUE: Helical data acquisition of the chest was obtained after intravenous administration of 80 mL Omnipaque 350 as per PE protocol. Images were reformatted in coronal and sagittal planes. Axial and coronal maximum intensity projection (MIP) images were created and reviewed.   FINDINGS: POTENTIAL LIMITATIONS OF THE STUDY: None   HEART AND VESSELS: There are no discrete filling defects within main pulmonary artery and its branches to suggest acute pulmonary embolism. Main pulmonary artery is mildly dilatedmeasuring up to 3.2 cm..   The thoracic aorta normal in course and caliber.No significant calcified atherosclerotic disease of the thoracic aorta.Near non-opacification of thoracic aorta precludes assessment for acute aortic pathology. No coronary artery calcifications are seen. Please note, the study is not optimized for evaluation of coronary arteries.   The severe cardiomegalywith left-greater-than-right four chamber enlargement.There is reflux of intravenous contrast into IVC and hepatic veins, suggestive of elevated right heart pressures. Addition, there is poor contrast opacification of the left heart, suggestive of left heart dysfunction. There is no pericardial effusion seen.   MEDIASTINUM AND PREET, LOWER NECK AND AXILLA: The visualized thyroid gland is within normal limits. Multiple prominent to mildly enlarged mediastinal lymph nodes, which appears slightly more conspicuous than prior examination. For example, there is a 1.4 cm para-aortic lymph node on axial image 86, series 401, which previously measured 1.1 cm. There is a 1.6 cm subcarinal lymph node on axial image 136 of 269, as compared to 1.4 cm previously. Esophagus appears within normal limits as seen.   LUNGS AND AIRWAYS: The trachea and central airways are patent. No endobronchial lesion is seen. There is mild diffuse bronchial wall thickening, which is a non-specific finding.   Redemonstrated mild upper lung  predominant centrilobular and paraseptal emphysema. Diffuse, basilar predominant mosaic attenuation with basilar predominance again noted. Bibasilar interlobular septal thickening. New trace bilateral pleural effusions. No pneumothorax. There is a stable 8 mm solid nodular density adjacent to right interlobar fissure on axial image 118 of 269, series 401. There are multiple small calcified benign granulomas seen within bilateral lungs.   UPPER ABDOMEN: Visualized abdominal structures are grossly unremarkable.   CHEST WALL AND OSSEOUS STRUCTURES: Nonspecific chest wall collateral vessels are less conspicuous than prior exam, likely due to contralateral contrast injection. Otherwise, chest wall is within normal limits. No acute osseous pathology.There are no suspicious osseous lesions.Mild multilevel degenerative changes within visualized spine.       1. No evidence of acute pulmonary embolism. 2. Suggestion of mild interstitial pulmonary edema and new trace bilateral pleural effusions, superimposed against background mosaic attenuation and emphysema. Correlate with patient's volume status. 3. Reflux of intravenous contrast into IVC and hepatic veins, suggestive of elevated right heart pressures. Poor contrast opacification of the left heart, suggestive of left heart dysfunction.  Recommend correlation with echocardiogram. 4. Multiple prominent to mildly enlarged mediastinal lymph nodes, which appears slightly more conspicuous than prior examination. The findings are nonspecific but could be seen with pulmonary edema or lymphoproliferative disease. 5. Additional findings as above.   I personally reviewed the images/study and I agree with the findings as stated by Dr. Mauri Rothman. This study was interpreted at [facility], [place], [place].   MACRO: None   Signed by: Timo Doe 7/16/2024 3:07 PM Dictation workstation:   [id number]    XR chest 1 view    Result Date:  7/16/2024  Interpreted By:  Nette Shearer and Booth Cameron STUDY: XR CHEST 1 VIEW;  7/16/2024 10:11 am   INDICATION: Signs/Symptoms:orthopneic dyspnea.   COMPARISON: XR chest 11/14/2023, CT chest 08/22/2023   ACCESSION NUMBER(S): [id number]   ORDERING CLINICIAN: KHARI HERNANDEZ   FINDINGS: CARDIOMEDIASTINAL SILHOUETTE: Cardiomediastinal silhouette is enlarged.   LUNGS: Left-greater-than-right small pleural effusions. There is an interstitial opacity in the right lower lobe that could represent acute infectious process versus atelectasis. Cephalization of the pulmonary vasculature noted bilaterally. No evidence of pneumothorax or pneumomediastinum.   ABDOMEN: No remarkable upper abdominal findings.   BONES: No acute osseous changes.       1.  Mild, left-greater-than-right pleural effusions and pulmonary vascular congestion consistent with acute heart failure exacerbation. 2. Right lower lobe interstitial opacity which could represent acute infectious process versus atelectatic change.   I personally reviewed the images/study and I agree with the findings as stated. This study was interpreted at [place], [place].   MACRO: None   Signed by: Nette Shearer 7/16/2024 11:20 AM Dictation workstation:   [id number]      ED Interventions:  Medications   amLODIPine (Norvasc) tablet 10 mg (has no administration in time range)   carvedilol (Coreg) tablet 25 mg ( oral Unheld by provider 7/16/24 1728)   losartan (Cozaar) tablet 100 mg ( oral Dose Auto Held 7/20/24 0900)   rivaroxaban (Xarelto) tablet 20 mg ( oral Dose Auto Held 7/20/24 1700)   spironolactone (Aldactone) tablet 25 mg (has no administration in time range)   polyethylene glycol (Glycolax, Miralax) packet 17 g (has no administration in time range)   perflutren lipid microspheres (Definity) injection 0.5-10 mL of dilution (has no administration in time range)   sulfur hexafluoride microsphr (Lumason) injection 24.28  mg (has no administration in time range)   perflutren protein A microsphere (Optison) injection 0.5 mL (has no administration in time range)   heparin bolus from bag 60 Units/kg (has no administration in time range)   heparin 25,000 Units in dextrose 5% 250 mL (100 Units/mL) infusion (premix) (has no administration in time range)   heparin bolus from bag 1,500-3,000 Units (has no administration in time range)   hydrALAZINE (Apresoline) tablet 25 mg (25 mg oral Given 7/16/24 1037)   amLODIPine (Norvasc) tablet 10 mg (10 mg oral Given 7/16/24 1037)   spironolactone (Aldactone) tablet 25 mg (25 mg oral Given 7/16/24 1037)   dicyclomine (Bentyl) capsule 10 mg (10 mg oral Given 7/16/24 1037)   ibuprofen tablet 600 mg (600 mg oral Given 7/16/24 1037)   iohexol (OMNIPaque) 350 mg iodine/mL solution 80 mL (80 mL intravenous Given 7/16/24 1219)   furosemide (Lasix) injection 40 mg (40 mg intravenous Given 7/16/24 1619)       Historical Data:  Vascular US lower extremity venous duplex bilateral    Result Date: 7/16/2024  Interpreted By:  Simon Bey, STUDY: [facility] US LOWER EXTREMITY VENOUS DUPLEX BILATERAL;  7/16/2024 2:55 pm   INDICATION: Signs/Symptoms:elevated d-dimer, nodule on left leg.   COMPARISON: Venous duplex ultrasound bilateral lower extremity 03/07/2018   ACCESSION NUMBER(S): [id number]   ORDERING CLINICIAN: KHARI HERNANDEZ   TECHNIQUE: Vascular ultrasound of the bilateral lower extremities was performed. Real-time compression views as well as Gray scale, color Doppler and spectral Doppler waveform analysis was performed.   FINDINGS: Evaluation of the visualized portions of the bilateral common femoral vein, proximal, mid, and distal femoral vein, and popliteal vein were performed.  Evaluation of the visualized portions of the  posterior tibial and peroneal veins were also performed.   Limitations:  None.   The left posterior tibial vein is noncompressible and demonstrates lack of flow on color Doppler, consistent  with occlusive thrombosis. There is adherent echogenic debris along the wall of the left common femoral vein with preserved flow and compressibility consistent with partial thrombus. Findings were seen on prior venous ultrasound 03/07/2018. The remaining evaluated veins demonstrate normal compressibility. There is intact venous flow demonstrating normal respiratory variability and normal augmentation of flow with calf compression. Respiratory variation and augmentation to calf pressure was noted.       1.  Left posterior tibial vein occlusive thrombosis. 2. Chronic left common femoral vein partial thrombus seen on prior ultrasound 03/07/2018. 3. No sonographic evidence for deep vein thrombosis within the evaluated veins of the right lower extremity.   I personally reviewed the images/study and I agree with Dr. Simon eBy findings as stated. This study was interpreted at [place], [place]   MACRO: Critical Finding:  See findings. Notification was initiated on 7/16/2024 at 3:31 pm by  Simon Bey.  (**-YCF-**) Instructions:     Dictation workstation:   [id number]    CT angio chest for pulmonary embolism    Result Date: 7/16/2024  Interpreted By:  Timo Doe and Ohs Zachary STUDY: CT ANGIO CHEST FOR PULMONARY EMBOLISM;  7/16/2024 12:18 pm   INDICATION: Signs/Symptoms:elevated dimer, off anticoagulant. Per EMR, 44-year-old female, former smoker, with history of DVT (previously described rivaroxaban, off for 2 months), diastolic CHF, presenting with abdominal pain and being out of her heart failure medication. Additionally endorses cough/shortness of breath for several months worsened with laying flat. Mid to mild bilateral lower extremity swelling.   COMPARISON: CT chest, abdomen and pelvis 08/22/2023.   ACCESSION NUMBER(S): [id number]   ORDERING CLINICIAN: KHARI HERNANDEZ   TECHNIQUE: Helical data acquisition of the chest was obtained after intravenous administration  of 80 mL Omnipaque 350 as per PE protocol. Images were reformatted in coronal and sagittal planes. Axial and coronal maximum intensity projection (MIP) images were created and reviewed.   FINDINGS: POTENTIAL LIMITATIONS OF THE STUDY: None   HEART AND VESSELS: There are no discrete filling defects within main pulmonary artery and its branches to suggest acute pulmonary embolism. Main pulmonary artery is mildly dilatedmeasuring up to 3.2 cm..   The thoracic aorta normal in course and caliber.No significant calcified atherosclerotic disease of the thoracic aorta.Near non-opacification of thoracic aorta precludes assessment for acute aortic pathology. No coronary artery calcifications are seen. Please note, the study is not optimized for evaluation of coronary arteries.   The severe cardiomegalywith left-greater-than-right four chamber enlargement.There is reflux of intravenous contrast into IVC and hepatic veins, suggestive of elevated right heart pressures. Addition, there is poor contrast opacification of the left heart, suggestive of left heart dysfunction. There is no pericardial effusion seen.   MEDIASTINUM AND PREET, LOWER NECK AND AXILLA: The visualized thyroid gland is within normal limits. Multiple prominent to mildly enlarged mediastinal lymph nodes, which appears slightly more conspicuous than prior examination. For example, there is a 1.4 cm para-aortic lymph node on axial image 86, series 401, which previously measured 1.1 cm. There is a 1.6 cm subcarinal lymph node on axial image 136 of 269, as compared to 1.4 cm previously. Esophagus appears within normal limits as seen.   LUNGS AND AIRWAYS: The trachea and central airways are patent. No endobronchial lesion is seen. There is mild diffuse bronchial wall thickening, which is a non-specific finding.   Redemonstrated mild upper lung predominant centrilobular and paraseptal emphysema. Diffuse, basilar predominant mosaic attenuation with basilar predominance  again noted. Bibasilar interlobular septal thickening. New trace bilateral pleural effusions. No pneumothorax. There is a stable 8 mm solid nodular density adjacent to right interlobar fissure on axial image 118 of 269, series 401. There are multiple small calcified benign granulomas seen within bilateral lungs.   UPPER ABDOMEN: Visualized abdominal structures are grossly unremarkable.   CHEST WALL AND OSSEOUS STRUCTURES: Nonspecific chest wall collateral vessels are less conspicuous than prior exam, likely due to contralateral contrast injection. Otherwise, chest wall is within normal limits. No acute osseous pathology.There are no suspicious osseous lesions.Mild multilevel degenerative changes within visualized spine.       1. No evidence of acute pulmonary embolism. 2. Suggestion of mild interstitial pulmonary edema and new trace bilateral pleural effusions, superimposed against background mosaic attenuation and emphysema. Correlate with patient's volume status. 3. Reflux of intravenous contrast into IVC and hepatic veins, suggestive of elevated right heart pressures. Poor contrast opacification of the left heart, suggestive of left heart dysfunction.  Recommend correlation with echocardiogram. 4. Multiple prominent to mildly enlarged mediastinal lymph nodes, which appears slightly more conspicuous than prior examination. The findings are nonspecific but could be seen with pulmonary edema or lymphoproliferative disease. 5. Additional findings as above.   I personally reviewed the images/study and I agree with the findings as stated by Dr. Mauri Rothman. This study was interpreted at [facility], [place], [place].   MACRO: None   Signed by: Timo Doe 7/16/2024 3:07 PM Dictation workstation:   [id number]    XR chest 1 view    Result Date: 7/16/2024  Interpreted By:  Nette Shearer and Booth Cameron STUDY: XR CHEST 1 VIEW;  7/16/2024 10:11 am   INDICATION:  Signs/Symptoms:orthopneic dyspnea.   COMPARISON: XR chest 2023, CT chest 2023   ACCESSION NUMBER(S): [id number]   ORDERING CLINICIAN: KHARI HERNANDEZ   FINDINGS: CARDIOMEDIASTINAL SILHOUETTE: Cardiomediastinal silhouette is enlarged.   LUNGS: Left-greater-than-right small pleural effusions. There is an interstitial opacity in the right lower lobe that could represent acute infectious process versus atelectasis. Cephalization of the pulmonary vasculature noted bilaterally. No evidence of pneumothorax or pneumomediastinum.   ABDOMEN: No remarkable upper abdominal findings.   BONES: No acute osseous changes.       1.  Mild, left-greater-than-right pleural effusions and pulmonary vascular congestion consistent with acute heart failure exacerbation. 2. Right lower lobe interstitial opacity which could represent acute infectious process versus atelectatic change.   I personally reviewed the images/study and I agree with the findings as stated. This study was interpreted at [place], [place].   MACRO: None   Signed by: Nette Shearer 2024 11:20 AM Dictation workstation:   [id number]        Past Medical History  Past Medical History:   Diagnosis Date    Acute pharyngitis, unspecified     Sore throat    Adjustment disorder, unspecified     Hospitalism    Deep phlebothrombosis in pregnancy, unspecified trimester ([facility])     DVT (deep vein thrombosis) in pregnancy    Dorsopathy, unspecified     Back problem    Encounter for screening for malignant neoplasm of vagina     Vaginal Pap smear    Essential (primary) hypertension 2013    Hypertension    Headache, unspecified 2013    Headache    Other conditions influencing health status     History of pregnancy    Personal history of other complications of pregnancy, childbirth and the puerperium     History of spontaneous     Personal history of other diseases of the respiratory system     History of  upper respiratory infection    Personal history of other diseases of the respiratory system     History of pharyngitis    Personal history of other diseases of the respiratory system     History of allergic rhinitis    Streptococcal infection, unspecified site     Streptococcus group B infection       Surgical History  Past Surgical History:   Procedure Laterality Date     SECTION, LOW TRANSVERSE  2014     Section Low Transverse       Social History  She reports that she has been smoking cigars and cigarettes. She does not have any smokeless tobacco history on file. She reports that she does not currently use alcohol. She reports that she does not currently use drugs.  Last alchol use was in 2024  Family History  No family history on file.     Allergies  Patient has no known allergies.     Physical Exam   Constitutional: No acute distress, resting comfortably in bed, cooperative  HEENT: Normocephalic, atraumatic, PERRLA, EOMI, moist mucous membranes, no pharyngeal erythema, (-) JVD  Cardiovascular: RRR, normal S1/S2, no murmurs noted  Pulmonary: Coarse breath sounds on right side  no increased work of breathing, 2L NC  GI: Soft, non-tender, non-distended, normoactive bowel sounds  : No collins catheter  Lower extremities: Warm and well perfused, minimal lower extremity edema  Neuro: A&O x3, able to move all 4 extremities spontaneously, normal gait  Psych: Appropriate mood and affect     Medications  Scheduled medications  [START ON 2024] amLODIPine, 10 mg, oral, Daily  carvedilol, 25 mg, oral, BID  heparin, 60 Units/kg, intravenous, Once  [Held by provider] losartan, 100 mg, oral, Daily  perflutren lipid microspheres, 0.5-10 mL of dilution, intravenous, Once in imaging  perflutren protein A microsphere, 0.5 mL, intravenous, Once in imaging  polyethylene glycol, 17 g, oral, Daily  [Held by provider] rivaroxaban, 20 mg, oral, Daily with evening meal  [START ON 2024] spironolactone, 25  mg, oral, Daily  sulfur hexafluoride microsphr, 2 mL, intravenous, Once in imaging      Continuous medications  heparin, 0-4,000 Units/hr      PRN medications  PRN medications: heparin      Assessment/Plan   Lorri Sams is a 44 y.o. female with PMHx of HFpEF (TTE LVEF 60-65%11/2023), PE 12/2022, TIA (2016) and CKD Stage 3a(Baseline Cr.~1.1-12) who presents to [facility] with abdominal pain and SOB.  Given PND, Orthopnea and SOB, she is likely in ADHF 2/2 Medication non-compliance. Cough could be related to ADHF but, given productive nature could be related to pneumonia, will send infectious workup to further evaluate. Abdominal pain actually was improving while in the room but, could be related to Constipation vs Lactose Intolerance. DVT U/S was notable for left posterior tibial occlusive thrombus and chronic left femoral vein thrombus will intiate Heparin gtt while inpatient and can transition to Xarelto closer to discharge.        #ADHF  #HFpEF  #HTN  :: Last TTE LVEF 60-65%11/2023  :: Likely 2/2 medication non-compliance  :: Home Diuretic-Bumex 1mg daily  -BP reduced to 160/110 in ED over 7 hours  -Restarted home Coreg 25 BID   -C/w home Amlodipine 10 daily  -Holding home Losartan iso IRSI  -Restarting home Hydralazine 25 TID  -C/w home Spironolactone 25 daily (Was already given in ED)  -TTE pendng  -Monitor electrolytes iso diuresis   -Consider further diuresis in AM, will hold off given recently given diuretic    #Left posterior tibial occlusive thrombus  #Hx of PE  ::DVT U/S(7/16)-left posterior tibial occlusive thrombus  ::CT-PE(7/16)-Negative for PE  -Home med-Xarelto 20 daily  -Started Low dose Heparin gtt and consider converting Xarelto at discharge    #AHRF  ::Ddx-Pneumonia vs ADHF  -Will cover with CAP Coverage but, low suspicion of underlying infection   -Procal,Urine strep, legionella , sputum Cx pending   -CTX (7/16-), Doxy(7/16-)    #Right Gluteal lesion  ::Was on cephalexin 500 mg TID for 10 days (Had 2  days left)  -Hip x ray pending for evaluation of Osteo, ESR, CRP,   -Consider Wound Care consult in AM  -Consider surgical consult in AM   -CTX (7/16-), Doxy(7/16-)  -Consider desclation of abx if procal negative and completion of course for possible skin infection     #AUD  ::Last drink 4/2024  -Patient reported  that provider switched her to the Naltrexone 50 mg tablet daily but, she has not picked medication up form pharmacy. She has never had withdrawal symptoms and she did not want to try this medication overnight.   [ ] Follow up on if she wants to start this medication inpatient    #Enlarged Mediastainal Lymph nodes   ::CT-PE(7/16)-Multiple prominent to mildly enlarged mediastinal lymph nodes,  which appears slightly more conspicuous than prior examination. The findings are nonspecific but could be seen with pulmonary edema or lymphoproliferative disease.  -Consider repeat CT imaging outpatient to see if this resolves  after diuresis and does not need further evaluation    #Abdominal Pain  ::Ddx- Constipation vs Lactose intolerance  ::KUB(7/16)-Moderate stool burden  -Mirlax 17 daily, Doc-senna 8.6-50 qhs      F : PRN  E: PRN  N: Adult diet 2-3 grams sodium, Cardiac; 70 gm fat; 2 - 3 grams Sodium  A:  PIV  DVT prophylaxis: Heparin gtt    Code Status: Full Code (confirmed on admission)   NOK: Extended Emergency Contact Information  Primary Emergency Contact: Toyin Musa  Mobile Phone: [phone]  Relation: Sister   needed? No  Secondary Emergency Contact: Nader John  Home Phone: [phone]  Relation: Other   needed? No        Phill Gamez MD  Internal Medicine, PGY-3

## 2024-07-16 NOTE — ED TRIAGE NOTES
Pt to ED via EMS from home with c/c SOB and abd pain. Pt states hx CHF/HTN, off 5 out of 7 meds for past few days because she ran out. SOB x few days, abd pain since last night after eating.

## 2024-07-16 NOTE — ED PROVIDER NOTES
HPI   Chief Complaint   Patient presents with    Abdominal Pain    Shortness of Breath       Patient is a 44-year-old female with past medical histories of DVT (prescribed rivaroxaban, off for 2 months), HFpEF (EF 60 to 65% in November, diastolic heart failure, on bumetanide), HTN presenting with chief complaint of abdominal pain and secondary complaint for being out of her heart failure medication.  Patient endorses that she had crampy abdominal pain since yesterday morning.  Endorses she thinks it might have been associated with milk in her coffee as she has previously had lactose intolerance.  Describes diffuse crampy pain associated with diarrhea yesterday as well as nausea but no emesis.  Denies fevers, chills, malaise or other infectious symptoms.  Does endorse cough for several months associated with lying flat and associated with shortness of breath while lying flat that patient thinks this might be related to being off her heart failure medications.  Does not endorse any recent unilateral swelling or pain in her legs but does report some mild bilateral swelling recently.  Endorses that her shortness of breath has gotten progressively worse recently and endorses she can only walk for about 10 minutes before becoming tired.  Does endorse it is worse with lying down flat but does not endorse any recent chest pain.  Endorses remote alcohol use but none currently.  Former smoker but quit greater than 6 months ago.  Does not endorse marijuana or other alcohol use.  Does endorse two prior C-sections but no other abdominal surgeries.  Still having regular bowel movements including diarrhea yesterday and passing gas currently.  Endorses still urinating regularly.  Endorses every 5 minutes when she is taking her water pill but does not endorse any dysuria otherwise.            Patient History   Past Medical History:   Diagnosis Date    Acute pharyngitis, unspecified     Sore throat    Adjustment disorder, unspecified      Hospitalism    Deep phlebothrombosis in pregnancy, unspecified trimester ([facility] [address] [facility]     DVT (deep vein thrombosis) in pregnancy    Dorsopathy, unspecified     Back problem    Encounter for screening for malignant neoplasm of vagina     Vaginal Pap smear    Essential (primary) hypertension 2013    Hypertension    Headache, unspecified 2013    Headache    Other conditions influencing health status     History of pregnancy    Personal history of other complications of pregnancy, childbirth and the puerperium     History of spontaneous     Personal history of other diseases of the respiratory system     History of upper respiratory infection    Personal history of other diseases of the respiratory system     History of pharyngitis    Personal history of other diseases of the respiratory system     History of allergic rhinitis    Streptococcal infection, unspecified site     Streptococcus group B infection     Past Surgical History:   Procedure Laterality Date     SECTION, LOW TRANSVERSE  2014     Section Low Transverse     No family history on file.  Social History     Tobacco Use    Smoking status: Every Day     Current packs/day: 1.00     Types: Cigars, Cigarettes    Smokeless tobacco: Not on file   Substance Use Topics    Alcohol use: Not Currently    Drug use: Not Currently       Physical Exam   ED Triage Vitals [24 0954]   Temperature Heart Rate Respirations BP   36.6 °C (97.9 °F) 89 20 (!) 187/103      Pulse Ox Temp Source Heart Rate Source Patient Position   95 % Temporal -- --      BP Location FiO2 (%)     -- --       Physical Exam  Constitutional:       Appearance: Normal appearance.   HENT:      Head: Normocephalic and atraumatic.   Eyes:      Extraocular Movements: Extraocular movements intact.   Cardiovascular:      Rate and Rhythm: Normal rate.      Comments: S1 plus S2.  2/6 systolic murmur best appreciated at apex.  2+ radial and PT pulses.  Pulmonary:       Effort: Pulmonary effort is normal.      Comments: Satting 93% on room air.  No increased work of breathing, no accessory muscle use.  Diminished aeration mildly at bilateral bases symmetric bilaterally.  No wheeze or other adventitious lung sounds.  Abdominal:      Comments: Soft, nondistended, tolerates deep palpation all 4 quadrants.  Negative De La Torre's, negative obturator, negative psoas sign.   Musculoskeletal:         General: Normal range of motion.      Cervical back: Normal range of motion.      Comments: Symmetric calves bilaterally.  No tautness or tenderness palpation in bilateral thighs or calves.  Small 1 cm nodule in the superficial medial left ankle.   Skin:     General: Skin is warm and dry.   Neurological:      General: No focal deficit present.      Mental Status: She is alert and oriented to person, place, and time.   Psychiatric:         Mood and Affect: Mood normal.         Behavior: Behavior normal.           ED Course & MDM   Diagnoses as of 07/17/24 2022   Shortness of breath   Acute on chronic congestive heart failure, unspecified heart failure type (Multi)                       No data recorded                      Medical Decision Making  EKG shows a normal rate of 87bpm, normal sinus rhythm, normal axis,  ms, QRS 1 4 ms, QTc 474 ms.  Less than 1.5 mm ST elevation in lead V2, T wave inversions in leads III, aVF present on prior EKG from the 16th, otherwise normal ST and T wave pattern with no evidence of acute ischemia or other acute findings.    Patient is a 44-year-old female with past medical histories of DVT (prescribed rivaroxaban, off for 2 months), HFpEF (EF 60 to 65% in November, diastolic heart failure, on bumetanide), HTN presenting with chief complaint of abdominal pain and secondary complaint for being out of her heart failure medication.  Constellation of patient's abdominal symptoms most consistent with lactose intolerance versus viral gastritis.  Patient otherwise  with benign abdominal exam, no transaminitis, no leukocytosis, generally well appearing and CT abdomen deferred at this time.  Patient otherwise does have evidence of heart failure exacerbation likely in the context of medication noncompliance.  Bilateral pleural effusions and diffuse B-lines on ultrasound.  EKG otherwise nonischemic as above, troponin trended from 59-1 38 more consistent with demand ischemia in the context of heart failure than AHMET.  BNP elevated to  1020.  Patient screened with D-dimer that was elevated prompting evaluation with a CT PE and duplex ultrasounds of bilateral lower extremities.  No evidence of pulmonary emboli but does have acute left popliteal DVT.  Patient has been noncompliant rivaroxaban for extended duration at this is not felt to represent treatment failure, therefore patient initiated back on home rivaroxaban.  Walking pulse ox obtained to assess safety for outpatient management but patient did have desats into the 80s during test.  Patient amenable to inpatient stay.  Case discussed with medicine patient admitted for further management of heart failure exacerbation, optimization of medications.    Patient seen and discussed with Dr. Sherin Parnell MD, PhD  Emergency Medicine PGY3          Procedure  Procedures     Cody Parnell MD  Resident  07/17/24 0660

## 2024-07-16 NOTE — H&P
History Of Present Illness  Lorri Sams is a 44 y.o. 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    Patient describes crampy, poorly localized, non-radiating abdominal pain. She states that the pain started yesterday morning after she had coffee with milk, and reports that she has had lactose intolerance in the past. The pain subsided in the evening and recurred this morning, without any additional dairy intake. She is currently not in pain. She also complains of flatulence and constipation (no bowel movement since pain onset, usually once daily), but is regularly passing gas. No aggravating factors. She has not taken any medication for the pain. She denies nausea/vomiting, fevers/chills, and urinary symptoms.    She reports worsening orthopnea and cough with clear sputum for the past 2 months, which she associates with being off her HF medications. She has been off all of her medications except for Bumetanide due to inability to reach her provider for refills. Notably, she was admitted in November 2023 for hypertensive emergency and ADHF of HFpEF and had also been out of her medications for 2 weeks due to inability to get to [facility] to [facility] them up, and had been off anticoagulation as well. She currently reports no chest pain or palpitations. She reports minor left lower leg swelling but no tenderness.    In ED, she was found to be hypoxic 89% and Hypertensive to the 187/103. Labwork was notable for troponemia of 159->138, BNP1,020, Lipase 61, D-Dimer 6,572 and VBG Ph 7.42 , PCO2 41 , PO2 37, Lactate 2.2 and CMP w/ elevated Cr. 1.31 (Baseline ~1.1-1.2). Given elevated  D-Dimer, CT-PE was ordered and was negative for PE but, did note mild pulmonary edema and enlarged mediastinal lymph nodes. CXR noted right lower lobe opacity which could represent infectious vs atelectasis changes. DVT U/S noted left posterior tibial occlusive thrombus and chronic left femoral vein thrombus. Patient was given Hydralazine 25mg, Amlodipine 10mg Spironolactone 25mg, Ibuprofen 600mg, dicylomine 10mg, IV Lasix 20mg and Rivoraxban 20mg.     Past Medical History  Past Medical History:   Diagnosis Date    Acute pharyngitis, unspecified     Sore throat    Adjustment disorder, unspecified     Hospitalism    Deep phlebothrombosis in pregnancy, unspecified trimester ([facility])     DVT (deep vein thrombosis) in pregnancy    Dorsopathy, unspecified     Back problem    Encounter for screening for malignant neoplasm of vagina     Vaginal Pap smear    Essential (primary) hypertension 2013    Hypertension    Headache, unspecified 2013    Headache    Other conditions influencing health status     History of pregnancy    Personal history of other complications of pregnancy, childbirth and the puerperium     History of spontaneous     Personal history of other diseases of the respiratory system     History of upper respiratory infection    Personal history of other diseases of the respiratory system     History of pharyngitis    Personal history of other diseases of the respiratory system     History of allergic rhinitis    Streptococcal infection, unspecified site     Streptococcus group B infection       Surgical History  Past Surgical History:   Procedure Laterality Date     SECTION, LOW TRANSVERSE  2014     Section Low Transverse        Social History  Patient reports smoking around 1 pack per week for the past 26 years (about 3.7 pack years) but stopped 4 weeks ago. She does not endorse alcohol, marijuana, or other drug use.     Family History  No family history on file.      Allergies  Patient has no known allergies.    Review of Systems   Constitutional: Negative.    HENT: Negative.     Eyes: Negative.    Respiratory:  Positive for cough and shortness of breath.    Gastrointestinal:  Positive for abdominal pain and constipation.   Endocrine: Negative.    Genitourinary: Negative.    Musculoskeletal: Negative.    Skin: Negative.    Allergic/Immunologic: Negative.    Neurological: Negative.    Hematological: Negative.    Psychiatric/Behavioral: Negative.          Physical Exam  Constitutional:       Appearance: Normal appearance.   Cardiovascular:      Rate and Rhythm: Normal rate and regular rhythm.      Pulses: Normal pulses.      Heart sounds: Normal heart sounds.   Pulmonary:      Breath sounds: Examination of the right-middle field reveals rhonchi. Examination of the left-middle field reveals rhonchi. Examination of the right-lower field reveals rhonchi. Examination of the left-lower field reveals rhonchi. Rhonchi present.   Abdominal:      General: Abdomen is protuberant. Bowel sounds are normal. There is distension.      Tenderness: There is no abdominal tenderness.   Musculoskeletal:      Right lower leg: No edema.      Left lower leg: No edema.   Neurological:      Mental Status: She is alert and oriented to person, place, and time.   Psychiatric:         Mood and Affect: Mood normal.         Behavior: Behavior normal.       Last Recorded Vitals  Blood pressure (Abnormal) 171/100, pulse 81, temperature 36.6 °C (97.9 °F), temperature source Temporal, resp. rate 16, weight 74.8 kg (165 lb), SpO2 99%.    Labs    Results for orders placed or performed during the hospital encounter of 07/16/24 (from the past 24 hour(s))   Blood Gas Venous Full Panel Unsolicited   Result Value Ref Range    POCT pH, Venous 7.42 7.33 - 7.43 pH    POCT pCO2, Venous 41 41 - 51 mm Hg    POCT pO2, Venous 37 35 - 45 mm Hg    POCT SO2, Venous 53 45 - 75 %    POCT Oxy Hemoglobin, Venous 50.7 45.0 - 75.0 %     POCT Hematocrit Calculated, Venous 41.0 36.0 - 46.0 %    POCT Sodium, Venous 135 (L) 136 - 145 mmol/L    POCT Potassium, Venous 4.5 3.5 - 5.3 mmol/L    POCT Chloride, Venous 101 98 - 107 mmol/L    POCT Ionized Calicum, Venous 1.19 1.10 - 1.33 mmol/L    POCT Glucose, Venous 151 (H) 74 - 99 mg/dL    POCT Lactate, Venous 2.2 (H) 0.4 - 2.0 mmol/L    POCT Base Excess, Venous 1.9 -2.0 - 3.0 mmol/L    POCT HCO3 Calculated, Venous 26.6 (H) 22.0 - 26.0 mmol/L    POCT Hemoglobin, Venous 13.8 12.0 - 16.0 g/dL    POCT Anion Gap, Venous 12.0 10.0 - 25.0 mmol/L    Patient Temperature 37.0 degrees Celsius   CBC and Auto Differential   Result Value Ref Range    WBC 7.1 4.4 - 11.3 x10*3/uL    nRBC 0.0 0.0 - 0.0 /100 WBCs    RBC 4.68 4.00 - 5.20 x10*6/uL    Hemoglobin 13.6 12.0 - 16.0 g/dL    Hematocrit 40.2 36.0 - 46.0 %    MCV 86 80 - 100 fL    MCH 29.1 26.0 - 34.0 pg    MCHC 33.8 32.0 - 36.0 g/dL    RDW 15.9 (H) 11.5 - 14.5 %    Platelets 215 150 - 450 x10*3/uL    Neutrophils % 60.7 40.0 - 80.0 %    Immature Granulocytes %, Automated 0.4 0.0 - 0.9 %    Lymphocytes % 29.1 13.0 - 44.0 %    Monocytes % 7.9 2.0 - 10.0 %    Eosinophils % 0.8 0.0 - 6.0 %    Basophils % 1.1 0.0 - 2.0 %    Neutrophils Absolute 4.30 1.20 - 7.70 x10*3/uL    Immature Granulocytes Absolute, Automated 0.03 0.00 - 0.70 x10*3/uL    Lymphocytes Absolute 2.06 1.20 - 4.80 x10*3/uL    Monocytes Absolute 0.56 0.10 - 1.00 x10*3/uL    Eosinophils Absolute 0.06 0.00 - 0.70 x10*3/uL    Basophils Absolute 0.08 0.00 - 0.10 x10*3/uL   Comprehensive metabolic panel   Result Value Ref Range    Glucose 139 (H) 74 - 99 mg/dL    Sodium 136 136 - 145 mmol/L    Potassium 4.7 3.5 - 5.3 mmol/L    Chloride 103 98 - 107 mmol/L    Bicarbonate 22 21 - 32 mmol/L    Anion Gap 16 10 - 20 mmol/L    Urea Nitrogen 20 6 - 23 mg/dL    Creatinine 1.31 (H) 0.50 - 1.05 mg/dL    eGFR 52 (L) >60 mL/min/1.73m*2    Calcium 8.2 (L) 8.6 - 10.6 mg/dL    Albumin 3.0 (L) 3.4 - 5.0 g/dL    Alkaline  Phosphatase 145 (H) 33 - 110 U/L    Total Protein 6.6 6.4 - 8.2 g/dL    AST 26 9 - 39 U/L    Bilirubin, Total 0.8 0.0 - 1.2 mg/dL    ALT 22 7 - 45 U/L   Magnesium   Result Value Ref Range    Magnesium 2.14 1.60 - 2.40 mg/dL   Lipase   Result Value Ref Range    Lipase 61 9 - 82 U/L   Troponin I, High Sensitivity   Result Value Ref Range    Troponin I, High Sensitivity ([facility]) 159 (HH) 0 - 34 ng/L   B-Type Natriuretic Peptide   Result Value Ref Range    BNP 1,020 (H) 0 - 99 pg/mL   D-Dimer, VTE Exclusion   Result Value Ref Range    D-Dimer, Quantitative VTE Exclusion 6,572 (H) <=500 ng/mL FEU   Troponin I, High Sensitivity   Result Value Ref Range    Troponin I, High Sensitivity ([facility]) 138 (HH) 0 - 34 ng/L   Coagulation Screen   Result Value Ref Range    Protime 15.7 (H) 9.8 - 12.8 seconds    INR 1.4 (H) 0.9 - 1.1    aPTT 66 (H) 27 - 38 seconds      Imaging (impressions):    Vascular US lower extremity venous duplex bilateral 7/16/2024  1. Left posterior tibial vein occlusive thrombosis. 2. Chronic left common femoral vein partial thrombus seen on prior ultrasound 03/07/2018. 3. No sonographic evidence for deep vein thrombosis within the evaluated veins of the right lower extremity    CT angio chest for pulmonary embolism 7/16/2024  1. No evidence of acute pulmonary embolism. 2. Suggestion of mild interstitial pulmonary edema and new trace bilateral pleural effusions, superimposed against background mosaic attenuation and emphysema. Correlate with patient's volume status. 3. Reflux of intravenous contrast into IVC and hepatic veins, suggestive of elevated right heart pressures. Poor contrast opacification of the left heart, suggestive of left heart dysfunction.  Recommend correlation with echocardiogram. 4. Multiple prominent to mildly enlarged mediastinal lymph nodes, which appears slightly more conspicuous than prior examination. The findings are nonspecific but could be seen with pulmonary edema or lymphoproliferative  disease. 5. Additional findings as above.    XR chest 7/16/2024  1. Mild, left-greater-than-right pleural effusions and pulmonary vascular congestion consistent with acute heart failure exacerbation. 2. Right lower lobe interstitial opacity which could represent acute infectious process versus atelectatic change.    XR abdomen 7/16/2024  Nonobstructive bowel gas pattern. Moderate amount of stool burden.     TTE 11/16/2023   1. Left ventricular systolic function is normal with a 60-65% estimated ejection fraction.   2. Increased LV mass.   3. Spectral Doppler shows an abnormal pattern of left ventricular diastolic filling.   4. There is severe concentric left ventricular hypertrophy.   5. The left atrium is moderately dilated.   6. Moderate mitral valve regurgitation.   7. Mildly elevated RVSP.   8. Mild aortic valve stenosis.   9. Moderate to severe aortic valve regurgitation.    Scheduled medications  [START ON 7/17/2024] amLODIPine, 10 mg, oral, Daily  carvedilol, 25 mg, oral, BID  cefTRIAXone, 1 g, intravenous, q24h  doxycycline, 100 mg, intravenous, q12h  heparin, 4,000 Units, intravenous, Once  [Held by provider] losartan, 100 mg, oral, Daily  perflutren lipid microspheres, 0.5-10 mL of dilution, intravenous, Once in imaging  perflutren protein A microsphere, 0.5 mL, intravenous, Once in imaging  polyethylene glycol, 17 g, oral, Daily  [Held by provider] rivaroxaban, 20 mg, oral, Daily with evening meal  sennosides-docusate sodium, 1 tablet, oral, Nightly  [START ON 7/17/2024] spironolactone, 25 mg, oral, Daily  sulfur hexafluoride microsphr, 2 mL, intravenous, Once in imaging      Continuous medications  heparin, 0-4,000 Units/hr      PRN medications  PRN medications: acetaminophen, benzonatate, heparin    Assessment/Plan   Lorri Sams is a 44-year-old female with past medical history of HFpEF (EF 60 to 65% in November, diastolic heart failure, on Bumetanide), DVT (prescribed rivaroxaban, off for 2 months),  HTN (on Amlodipine, Losartan, Hydralazine, and Spironolactone), presenting with abdominal pain and complaint of shortness of breath likely due to being out of her heart failure medication.    #ADHF  #HFpEF  Patient with past medical history of HFpEF (EF 60 to 65% in November, diastolic heart failure, on Bumex 1 mg daily), off GDMT for the past 2 months except for Bumetanide.   Presents with increasing SOB and cough since being off her medication.  Bilateral crackles in the lower and mid lung fields.  Mild, left-greater-than-right pleural effusions and pulmonary vascular congestion consistent with acute heart failure exacerbation  TTE results pending  Received Lasix 40mg BID in the ER - held for now  Hold home Spironolactone 25mg daily (given in ED)  Daily weights  Monitor electrolytes  Consider further diuresis tomorrow    #Hypertension  #LV Hypertrophy        -    BP reduced to 160/110 in ED over 7 hours         -    Increased LVM previously noted on prior TTE, follow up on resolution on recently ordered TTE   Restarted home Amlodipine 10mg daily  Restarted home Carvedilol 25mg BID  Restarted home Hydralazine 25mg TID  Hold home Losartan 100mg daily iso IRIS    #AHRF  Pneumonia vs ADHF  - Will cover with CAP Coverage but low suspicion of underlying infection.  - Procal, Urine strep, Legionella, Sputum culture pending   - Can consider stopping Abx tomorrow once procal negative   - CTX (7/16-), Doxy (7/16-)    #Abdominal Pain  Constipation vs Lactose Intolerance  Patient with prior lactose intolerance presenting with crampy abdominal pain and constipation after dairy consumption yesterday morning, with no other associated symptoms. Currently not in pain.  KUB (7/16) - Nonobstructive bowel gas pattern. Moderate amount of stool burden.   Started on Mirlax 17 daily, Doc-senna 8.6-50 qhs      #DVT  #History of PE  Patient off home Xarelto 20mg  Lower leg U/S: posterior tibial vein occlusive thrombosis  CTA negative for  PE  Started on low intensity heparin protocol.    #Enlarged Mediastinal Lymph Nodes  - CTA (7/16): Multiple prominent to mildly enlarged mediastinal lymph nodes,  which appears slightly more conspicuous than prior examination. The findings are nonspecific but could be seen with pulmonary edema or lymphoproliferative disease.  - Consider repeat CT imaging outpatient to see if this resolves and does not need further evaluation       Code Status: Full Code (confirmed on admission)   NOK: Extended Emergency Contact Information  Primary Emergency Contact: Toyin Musa  Mobile Phone: [phone]  Relation: Sister   needed? No  Secondary Emergency Contact: DoraNader  [place] Phone: [phone]  Relation: Other   needed? No    Forest Delacruz MD, Msc  PGY-1, Internal Medicine

## 2024-07-17 ENCOUNTER — APPOINTMENT (OUTPATIENT)
Dept: CARDIOLOGY | Facility: HOSPITAL | Age: 44
End: 2024-07-17
Payer: COMMERCIAL

## 2024-07-17 PROBLEM — N18.30 CKD (CHRONIC KIDNEY DISEASE) STAGE 3, GFR 30-59 ML/MIN (MULTI): Status: ACTIVE | Noted: 2024-07-17

## 2024-07-17 LAB
ALBUMIN SERPL BCP-MCNC: 2.7 G/DL (ref 3.4–5)
ALBUMIN SERPL BCP-MCNC: 3.2 G/DL (ref 3.4–5)
AMPHETAMINES UR QL SCN: NORMAL
ANION GAP SERPL CALC-SCNC: 13 MMOL/L (ref 10–20)
ANION GAP SERPL CALC-SCNC: 13 MMOL/L (ref 10–20)
AORTIC VALVE MEAN GRADIENT: 23.9 MMHG
AORTIC VALVE PEAK VELOCITY: 3.2 M/S
APPEARANCE UR: CLEAR
AV PEAK GRADIENT: 41 MMHG
AVA (PEAK VEL): 1.69 CM2
AVA (VTI): 1.66 CM2
BACTERIA SPEC RESP CULT: ABNORMAL
BARBITURATES UR QL SCN: NORMAL
BASOPHILS # BLD AUTO: 0.09 X10*3/UL (ref 0–0.1)
BASOPHILS NFR BLD AUTO: 1.3 %
BENZODIAZ UR QL SCN: NORMAL
BILIRUB UR STRIP.AUTO-MCNC: NEGATIVE MG/DL
BUN SERPL-MCNC: 20 MG/DL (ref 6–23)
BUN SERPL-MCNC: 21 MG/DL (ref 6–23)
BZE UR QL SCN: NORMAL
CALCIUM SERPL-MCNC: 7.8 MG/DL (ref 8.6–10.6)
CALCIUM SERPL-MCNC: 9.2 MG/DL (ref 8.6–10.6)
CANNABINOIDS UR QL SCN: NORMAL
CHLORIDE SERPL-SCNC: 98 MMOL/L (ref 98–107)
CHLORIDE SERPL-SCNC: 99 MMOL/L (ref 98–107)
CO2 SERPL-SCNC: 25 MMOL/L (ref 21–32)
CO2 SERPL-SCNC: 29 MMOL/L (ref 21–32)
COLOR UR: ABNORMAL
CREAT SERPL-MCNC: 1.27 MG/DL (ref 0.5–1.05)
CREAT SERPL-MCNC: 1.37 MG/DL (ref 0.5–1.05)
EGFRCR SERPLBLD CKD-EPI 2021: 49 ML/MIN/1.73M*2
EGFRCR SERPLBLD CKD-EPI 2021: 54 ML/MIN/1.73M*2
EJECTION FRACTION APICAL 4 CHAMBER: 51.8
EJECTION FRACTION: 60 %
EOSINOPHIL # BLD AUTO: 0.2 X10*3/UL (ref 0–0.7)
EOSINOPHIL NFR BLD AUTO: 2.9 %
ERYTHROCYTE [DISTWIDTH] IN BLOOD BY AUTOMATED COUNT: 16.4 % (ref 11.5–14.5)
ERYTHROCYTE [SEDIMENTATION RATE] IN BLOOD BY WESTERGREN METHOD: 33 MM/H (ref 0–20)
EST. AVERAGE GLUCOSE BLD GHB EST-MCNC: 111 MG/DL
FENTANYL+NORFENTANYL UR QL SCN: NORMAL
GLUCOSE SERPL-MCNC: 147 MG/DL (ref 74–99)
GLUCOSE SERPL-MCNC: 88 MG/DL (ref 74–99)
GLUCOSE UR STRIP.AUTO-MCNC: NORMAL MG/DL
GRAM STN SPEC: ABNORMAL
HBA1C MFR BLD: 5.5 %
HCT VFR BLD AUTO: 38.4 % (ref 36–46)
HGB BLD-MCNC: 12.1 G/DL (ref 12–16)
HOLD SPECIMEN: NORMAL
IMM GRANULOCYTES # BLD AUTO: 0.01 X10*3/UL (ref 0–0.7)
IMM GRANULOCYTES NFR BLD AUTO: 0.1 % (ref 0–0.9)
KETONES UR STRIP.AUTO-MCNC: NEGATIVE MG/DL
LEFT ATRIUM VOLUME AREA LENGTH INDEX BSA: 49.4 ML/M2
LEFT VENTRICLE INTERNAL DIMENSION DIASTOLE: 5.14 CM (ref 3.5–6)
LEFT VENTRICULAR OUTFLOW TRACT DIAMETER: 2.04 CM
LEUKOCYTE ESTERASE UR QL STRIP.AUTO: NEGATIVE
LYMPHOCYTES # BLD AUTO: 2.91 X10*3/UL (ref 1.2–4.8)
LYMPHOCYTES NFR BLD AUTO: 42.1 %
MAGNESIUM SERPL-MCNC: 1.8 MG/DL (ref 1.6–2.4)
MAGNESIUM SERPL-MCNC: 1.81 MG/DL (ref 1.6–2.4)
MCH RBC QN AUTO: 28.7 PG (ref 26–34)
MCHC RBC AUTO-ENTMCNC: 31.5 G/DL (ref 32–36)
MCV RBC AUTO: 91 FL (ref 80–100)
METHADONE UR QL SCN: NORMAL
MITRAL VALVE E/A RATIO: 1.65
MITRAL VALVE E/E' RATIO: 43.16
MONOCYTES # BLD AUTO: 0.71 X10*3/UL (ref 0.1–1)
MONOCYTES NFR BLD AUTO: 10.3 %
NEUTROPHILS # BLD AUTO: 3 X10*3/UL (ref 1.2–7.7)
NEUTROPHILS NFR BLD AUTO: 43.3 %
NITRITE UR QL STRIP.AUTO: NEGATIVE
NRBC BLD-RTO: 0 /100 WBCS (ref 0–0)
OPIATES UR QL SCN: NORMAL
OXYCODONE+OXYMORPHONE UR QL SCN: NORMAL
PCP UR QL SCN: NORMAL
PH UR STRIP.AUTO: 6.5 [PH]
PHOSPHATE SERPL-MCNC: 3.2 MG/DL (ref 2.5–4.9)
PHOSPHATE SERPL-MCNC: 3.6 MG/DL (ref 2.5–4.9)
PLATELET # BLD AUTO: 213 X10*3/UL (ref 150–450)
POTASSIUM SERPL-SCNC: 3.7 MMOL/L (ref 3.5–5.3)
POTASSIUM SERPL-SCNC: 4.4 MMOL/L (ref 3.5–5.3)
PROCALCITONIN SERPL-MCNC: 0.05 NG/ML
PROT UR STRIP.AUTO-MCNC: ABNORMAL MG/DL
RBC # BLD AUTO: 4.22 X10*6/UL (ref 4–5.2)
RBC # UR STRIP.AUTO: NEGATIVE /UL
RBC #/AREA URNS AUTO: NORMAL /HPF
RIGHT VENTRICLE FREE WALL PEAK S': 7 CM/S
RIGHT VENTRICLE PEAK SYSTOLIC PRESSURE: 88.6 MMHG
SODIUM SERPL-SCNC: 133 MMOL/L (ref 136–145)
SODIUM SERPL-SCNC: 136 MMOL/L (ref 136–145)
SP GR UR STRIP.AUTO: 1.03
SQUAMOUS #/AREA URNS AUTO: NORMAL /HPF
TRICUSPID ANNULAR PLANE SYSTOLIC EXCURSION: 1.9 CM
UFH PPP CHRO-ACNC: 0.7 IU/ML
UFH PPP CHRO-ACNC: >2 IU/ML
UFH PPP CHRO-ACNC: >2 IU/ML
UROBILINOGEN UR STRIP.AUTO-MCNC: ABNORMAL MG/DL
WBC # BLD AUTO: 6.9 X10*3/UL (ref 4.4–11.3)
WBC #/AREA URNS AUTO: NORMAL /HPF

## 2024-07-17 PROCEDURE — 80069 RENAL FUNCTION PANEL: CPT | Performed by: NURSE PRACTITIONER

## 2024-07-17 PROCEDURE — 99233 SBSQ HOSP IP/OBS HIGH 50: CPT | Performed by: INTERNAL MEDICINE

## 2024-07-17 PROCEDURE — 85025 COMPLETE CBC W/AUTO DIFF WBC: CPT

## 2024-07-17 PROCEDURE — 80307 DRUG TEST PRSMV CHEM ANLYZR: CPT | Performed by: STUDENT IN AN ORGANIZED HEALTH CARE EDUCATION/TRAINING PROGRAM

## 2024-07-17 PROCEDURE — 85520 HEPARIN ASSAY: CPT | Performed by: EMERGENCY MEDICINE

## 2024-07-17 PROCEDURE — 83735 ASSAY OF MAGNESIUM: CPT | Performed by: NURSE PRACTITIONER

## 2024-07-17 PROCEDURE — 81001 URINALYSIS AUTO W/SCOPE: CPT

## 2024-07-17 PROCEDURE — 2500000001 HC RX 250 WO HCPCS SELF ADMINISTERED DRUGS (ALT 637 FOR MEDICARE OP): Performed by: STUDENT IN AN ORGANIZED HEALTH CARE EDUCATION/TRAINING PROGRAM

## 2024-07-17 PROCEDURE — 2500000004 HC RX 250 GENERAL PHARMACY W/ HCPCS (ALT 636 FOR OP/ED): Performed by: NURSE PRACTITIONER

## 2024-07-17 PROCEDURE — 2500000004 HC RX 250 GENERAL PHARMACY W/ HCPCS (ALT 636 FOR OP/ED)

## 2024-07-17 PROCEDURE — 2500000002 HC RX 250 W HCPCS SELF ADMINISTERED DRUGS (ALT 637 FOR MEDICARE OP, ALT 636 FOR OP/ED): Performed by: STUDENT IN AN ORGANIZED HEALTH CARE EDUCATION/TRAINING PROGRAM

## 2024-07-17 PROCEDURE — 85652 RBC SED RATE AUTOMATED: CPT

## 2024-07-17 PROCEDURE — 2500000004 HC RX 250 GENERAL PHARMACY W/ HCPCS (ALT 636 FOR OP/ED): Performed by: INTERNAL MEDICINE

## 2024-07-17 PROCEDURE — 83036 HEMOGLOBIN GLYCOSYLATED A1C: CPT | Performed by: STUDENT IN AN ORGANIZED HEALTH CARE EDUCATION/TRAINING PROGRAM

## 2024-07-17 PROCEDURE — 87205 SMEAR GRAM STAIN: CPT

## 2024-07-17 PROCEDURE — 83735 ASSAY OF MAGNESIUM: CPT

## 2024-07-17 PROCEDURE — 36415 COLL VENOUS BLD VENIPUNCTURE: CPT | Performed by: EMERGENCY MEDICINE

## 2024-07-17 PROCEDURE — 36415 COLL VENOUS BLD VENIPUNCTURE: CPT

## 2024-07-17 PROCEDURE — 1100000001 HC PRIVATE ROOM DAILY

## 2024-07-17 PROCEDURE — 2500000005 HC RX 250 GENERAL PHARMACY W/O HCPCS

## 2024-07-17 PROCEDURE — 2500000002 HC RX 250 W HCPCS SELF ADMINISTERED DRUGS (ALT 637 FOR MEDICARE OP, ALT 636 FOR OP/ED)

## 2024-07-17 PROCEDURE — 93306 TTE W/DOPPLER COMPLETE: CPT

## 2024-07-17 PROCEDURE — 87449 NOS EACH ORGANISM AG IA: CPT

## 2024-07-17 PROCEDURE — 87899 AGENT NOS ASSAY W/OPTIC: CPT

## 2024-07-17 PROCEDURE — 2500000001 HC RX 250 WO HCPCS SELF ADMINISTERED DRUGS (ALT 637 FOR MEDICARE OP)

## 2024-07-17 PROCEDURE — 84145 PROCALCITONIN (PCT): CPT | Performed by: STUDENT IN AN ORGANIZED HEALTH CARE EDUCATION/TRAINING PROGRAM

## 2024-07-17 PROCEDURE — 87040 BLOOD CULTURE FOR BACTERIA: CPT

## 2024-07-17 PROCEDURE — 80069 RENAL FUNCTION PANEL: CPT

## 2024-07-17 PROCEDURE — 93306 TTE W/DOPPLER COMPLETE: CPT | Performed by: INTERNAL MEDICINE

## 2024-07-17 RX ORDER — BUMETANIDE 1 MG/1
1 TABLET ORAL DAILY
Status: DISCONTINUED | OUTPATIENT
Start: 2024-07-17 | End: 2024-07-18 | Stop reason: HOSPADM

## 2024-07-17 RX ORDER — IPRATROPIUM BROMIDE AND ALBUTEROL SULFATE 2.5; .5 MG/3ML; MG/3ML
3 SOLUTION RESPIRATORY (INHALATION)
Status: DISCONTINUED | OUTPATIENT
Start: 2024-07-17 | End: 2024-07-17

## 2024-07-17 RX ORDER — DOXYCYCLINE HYCLATE 100 MG
100 TABLET ORAL EVERY 12 HOURS SCHEDULED
Status: DISCONTINUED | OUTPATIENT
Start: 2024-07-17 | End: 2024-07-18 | Stop reason: HOSPADM

## 2024-07-17 RX ORDER — VANCOMYCIN HYDROCHLORIDE 1 G/20ML
INJECTION, POWDER, LYOPHILIZED, FOR SOLUTION INTRAVENOUS DAILY PRN
Status: DISCONTINUED | OUTPATIENT
Start: 2024-07-17 | End: 2024-07-17

## 2024-07-17 RX ORDER — IPRATROPIUM BROMIDE AND ALBUTEROL SULFATE 2.5; .5 MG/3ML; MG/3ML
3 SOLUTION RESPIRATORY (INHALATION) EVERY 6 HOURS PRN
Status: DISCONTINUED | OUTPATIENT
Start: 2024-07-17 | End: 2024-07-18 | Stop reason: HOSPADM

## 2024-07-17 RX ORDER — MAGNESIUM SULFATE HEPTAHYDRATE 40 MG/ML
2 INJECTION, SOLUTION INTRAVENOUS ONCE
Status: COMPLETED | OUTPATIENT
Start: 2024-07-17 | End: 2024-07-17

## 2024-07-17 RX ORDER — CLINDAMYCIN PHOSPHATE 600 MG/50ML
600 INJECTION, SOLUTION INTRAVENOUS EVERY 8 HOURS
Status: DISCONTINUED | OUTPATIENT
Start: 2024-07-17 | End: 2024-07-17

## 2024-07-17 RX ORDER — HYDRALAZINE HYDROCHLORIDE 25 MG/1
25 TABLET, FILM COATED ORAL 3 TIMES DAILY
Status: DISCONTINUED | OUTPATIENT
Start: 2024-07-17 | End: 2024-07-17

## 2024-07-17 RX ORDER — LANOLIN ALCOHOL/MO/W.PET/CERES
400 CREAM (GRAM) TOPICAL ONCE
Status: COMPLETED | OUTPATIENT
Start: 2024-07-17 | End: 2024-07-17

## 2024-07-17 SDOH — SOCIAL STABILITY: SOCIAL INSECURITY: ABUSE: ADULT

## 2024-07-17 SDOH — SOCIAL STABILITY: SOCIAL INSECURITY: WITHIN THE LAST YEAR, HAVE YOU BEEN HUMILIATED OR EMOTIONALLY ABUSED IN OTHER WAYS BY YOUR PARTNER OR EX-PARTNER?: NO

## 2024-07-17 SDOH — ECONOMIC STABILITY: TRANSPORTATION INSECURITY
IN THE PAST 12 MONTHS, HAS LACK OF TRANSPORTATION KEPT YOU FROM MEETINGS, WORK, OR FROM GETTING THINGS NEEDED FOR DAILY LIVING?: PATIENT DECLINED

## 2024-07-17 SDOH — SOCIAL STABILITY: SOCIAL NETWORK: HOW OFTEN DO YOU ATTEND CHURCH OR RELIGIOUS SERVICES?: PATIENT DECLINED

## 2024-07-17 SDOH — SOCIAL STABILITY: SOCIAL INSECURITY: ARE THERE ANY APPARENT SIGNS OF INJURIES/BEHAVIORS THAT COULD BE RELATED TO ABUSE/NEGLECT?: NO

## 2024-07-17 SDOH — SOCIAL STABILITY: SOCIAL INSECURITY: WITHIN THE LAST YEAR, HAVE YOU BEEN AFRAID OF YOUR PARTNER OR EX-PARTNER?: NO

## 2024-07-17 SDOH — HEALTH STABILITY: MENTAL HEALTH: HOW OFTEN DO YOU HAVE A DRINK CONTAINING ALCOHOL?: NEVER

## 2024-07-17 SDOH — SOCIAL STABILITY: SOCIAL INSECURITY: HAVE YOU HAD ANY THOUGHTS OF HARMING ANYONE ELSE?: NO

## 2024-07-17 SDOH — SOCIAL STABILITY: SOCIAL INSECURITY
WITHIN THE LAST YEAR, HAVE TO BEEN RAPED OR FORCED TO HAVE ANY KIND OF SEXUAL ACTIVITY BY YOUR PARTNER OR EX-PARTNER?: NO

## 2024-07-17 SDOH — ECONOMIC STABILITY: FOOD INSECURITY: WITHIN THE PAST 12 MONTHS, YOU WORRIED THAT YOUR FOOD WOULD RUN OUT BEFORE YOU GOT MONEY TO BUY MORE.: PATIENT DECLINED

## 2024-07-17 SDOH — ECONOMIC STABILITY: INCOME INSECURITY: IN THE LAST 12 MONTHS, WAS THERE A TIME WHEN YOU WERE NOT ABLE TO PAY THE MORTGAGE OR RENT ON TIME?: PATIENT DECLINED

## 2024-07-17 SDOH — SOCIAL STABILITY: SOCIAL NETWORK: IN A TYPICAL WEEK, HOW MANY TIMES DO YOU TALK ON THE PHONE WITH FAMILY, FRIENDS, OR NEIGHBORS?: PATIENT DECLINED

## 2024-07-17 SDOH — SOCIAL STABILITY: SOCIAL INSECURITY: HAS ANYONE EVER THREATENED TO HURT YOUR FAMILY OR YOUR PETS?: NO

## 2024-07-17 SDOH — HEALTH STABILITY: MENTAL HEALTH: HOW OFTEN DO YOU HAVE 6 OR MORE DRINKS ON ONE OCCASION?: NEVER

## 2024-07-17 SDOH — SOCIAL STABILITY: SOCIAL NETWORK
DO YOU BELONG TO ANY CLUBS OR ORGANIZATIONS SUCH AS CHURCH GROUPS UNIONS, FRATERNAL OR ATHLETIC GROUPS, OR SCHOOL GROUPS?: PATIENT DECLINED

## 2024-07-17 SDOH — ECONOMIC STABILITY: TRANSPORTATION INSECURITY
IN THE PAST 12 MONTHS, HAS THE LACK OF TRANSPORTATION KEPT YOU FROM MEDICAL APPOINTMENTS OR FROM GETTING MEDICATIONS?: PATIENT DECLINED

## 2024-07-17 SDOH — ECONOMIC STABILITY: HOUSING INSECURITY: AT ANY TIME IN THE PAST 12 MONTHS, WERE YOU HOMELESS OR LIVING IN A SHELTER (INCLUDING NOW)?: PATIENT DECLINED

## 2024-07-17 SDOH — SOCIAL STABILITY: SOCIAL INSECURITY: DO YOU FEEL UNSAFE GOING BACK TO THE PLACE WHERE YOU ARE LIVING?: NO

## 2024-07-17 SDOH — SOCIAL STABILITY: SOCIAL NETWORK: HOW OFTEN DO YOU GET TOGETHER WITH FRIENDS OR RELATIVES?: PATIENT DECLINED

## 2024-07-17 SDOH — SOCIAL STABILITY: SOCIAL INSECURITY: ARE YOU OR HAVE YOU BEEN THREATENED OR ABUSED PHYSICALLY, EMOTIONALLY, OR SEXUALLY BY ANYONE?: NO

## 2024-07-17 SDOH — SOCIAL STABILITY: SOCIAL NETWORK: HOW OFTEN DO YOU ATTENT MEETINGS OF THE CLUB OR ORGANIZATION YOU BELONG TO?: PATIENT DECLINED

## 2024-07-17 SDOH — SOCIAL STABILITY: SOCIAL NETWORK: ARE YOU MARRIED, WIDOWED, DIVORCED, SEPARATED, NEVER MARRIED, OR LIVING WITH A PARTNER?: PATIENT DECLINED

## 2024-07-17 SDOH — SOCIAL STABILITY: SOCIAL INSECURITY
WITHIN THE LAST YEAR, HAVE YOU BEEN KICKED, HIT, SLAPPED, OR OTHERWISE PHYSICALLY HURT BY YOUR PARTNER OR EX-PARTNER?: NO

## 2024-07-17 SDOH — HEALTH STABILITY: MENTAL HEALTH
STRESS IS WHEN SOMEONE FEELS TENSE, NERVOUS, ANXIOUS, OR CAN'T SLEEP AT NIGHT BECAUSE THEIR MIND IS TROUBLED. HOW STRESSED ARE YOU?: PATIENT DECLINED

## 2024-07-17 SDOH — HEALTH STABILITY: MENTAL HEALTH: HOW MANY STANDARD DRINKS CONTAINING ALCOHOL DO YOU HAVE ON A TYPICAL DAY?: PATIENT DOES NOT DRINK

## 2024-07-17 SDOH — ECONOMIC STABILITY: HOUSING INSECURITY: IN THE PAST 12 MONTHS, HOW MANY TIMES HAVE YOU MOVED WHERE YOU WERE LIVING?: 1

## 2024-07-17 SDOH — ECONOMIC STABILITY: FOOD INSECURITY: WITHIN THE PAST 12 MONTHS, THE FOOD YOU BOUGHT JUST DIDN'T LAST AND YOU DIDN'T HAVE MONEY TO GET MORE.: PATIENT DECLINED

## 2024-07-17 SDOH — SOCIAL STABILITY: SOCIAL INSECURITY: DOES ANYONE TRY TO KEEP YOU FROM HAVING/CONTACTING OTHER FRIENDS OR DOING THINGS OUTSIDE YOUR HOME?: NO

## 2024-07-17 SDOH — SOCIAL STABILITY: SOCIAL INSECURITY: DO YOU FEEL ANYONE HAS EXPLOITED OR TAKEN ADVANTAGE OF YOU FINANCIALLY OR OF YOUR PERSONAL PROPERTY?: NO

## 2024-07-17 SDOH — ECONOMIC STABILITY: INCOME INSECURITY: HOW HARD IS IT FOR YOU TO PAY FOR THE VERY BASICS LIKE FOOD, HOUSING, MEDICAL CARE, AND HEATING?: PATIENT DECLINED

## 2024-07-17 SDOH — SOCIAL STABILITY: SOCIAL INSECURITY: HAVE YOU HAD THOUGHTS OF HARMING ANYONE ELSE?: NO

## 2024-07-17 ASSESSMENT — COGNITIVE AND FUNCTIONAL STATUS - GENERAL
DAILY ACTIVITIY SCORE: 24
MOBILITY SCORE: 24
MOBILITY SCORE: 24
PATIENT BASELINE BEDBOUND: NO
MOBILITY SCORE: 24
DAILY ACTIVITIY SCORE: 24
DAILY ACTIVITIY SCORE: 24

## 2024-07-17 ASSESSMENT — ACTIVITIES OF DAILY LIVING (ADL)
WALKS IN HOME: INDEPENDENT
BATHING: INDEPENDENT
ADEQUATE_TO_COMPLETE_ADL: YES
LACK_OF_TRANSPORTATION: NO
TOILETING: INDEPENDENT
HEARING - RIGHT EAR: FUNCTIONAL
FEEDING YOURSELF: INDEPENDENT
PATIENT'S MEMORY ADEQUATE TO SAFELY COMPLETE DAILY ACTIVITIES?: YES
DRESSING YOURSELF: INDEPENDENT
JUDGMENT_ADEQUATE_SAFELY_COMPLETE_DAILY_ACTIVITIES: YES
HEARING - LEFT EAR: FUNCTIONAL
GROOMING: INDEPENDENT

## 2024-07-17 ASSESSMENT — LIFESTYLE VARIABLES
SUBSTANCE_ABUSE_PAST_12_MONTHS: NO
AUDIT-C TOTAL SCORE: 0
HOW OFTEN DO YOU HAVE 6 OR MORE DRINKS ON ONE OCCASION: NEVER
HOW OFTEN DO YOU HAVE A DRINK CONTAINING ALCOHOL: NEVER
HOW MANY STANDARD DRINKS CONTAINING ALCOHOL DO YOU HAVE ON A TYPICAL DAY: PATIENT DOES NOT DRINK
SKIP TO QUESTIONS 9-10: 1
SKIP TO QUESTIONS 9-10: 1
PRESCIPTION_ABUSE_PAST_12_MONTHS: NO
AUDIT-C TOTAL SCORE: 0
AUDIT-C TOTAL SCORE: 0

## 2024-07-17 ASSESSMENT — PATIENT HEALTH QUESTIONNAIRE - PHQ9
2. FEELING DOWN, DEPRESSED OR HOPELESS: NOT AT ALL
1. LITTLE INTEREST OR PLEASURE IN DOING THINGS: NOT AT ALL
SUM OF ALL RESPONSES TO PHQ9 QUESTIONS 1 & 2: 0

## 2024-07-17 ASSESSMENT — PAIN - FUNCTIONAL ASSESSMENT
PAIN_FUNCTIONAL_ASSESSMENT: 0-10
PAIN_FUNCTIONAL_ASSESSMENT: 0-10

## 2024-07-17 ASSESSMENT — PAIN SCALES - GENERAL
PAINLEVEL_OUTOF10: 0 - NO PAIN
PAINLEVEL_OUTOF10: 0 - NO PAIN

## 2024-07-17 NOTE — PROGRESS NOTES
Pharmacy Admission Order Reconciliation Review    Lorri Sams is a 44 y.o. female admitted for Shortness of breath. Pharmacy reviewed the patient's unreconciled admission medications.    Prior to admission medications that were reviewed and acted on by the pharmacist include:  Keflex  Vivitrol  Neosporin  These medications have been reconciled.     Any other unreconcilied medications have been addressed and will be ordered or held by the patient's medical team. Medications addressed by the pharmacist may be added or changed by the patient's medical team at any time.    Yas Bain, PharmD  Transitions of Care Pharmacist  [facility]  Please reach out via Secure Chat for questions

## 2024-07-17 NOTE — PROGRESS NOTES
07/17/24 1503   Discharge Planning   Living Arrangements Children   Support Systems Children   Assistance Needed None   Type of Residence Private residence   Number of Stairs to Enter Residence 0   Number of Stairs Within Residence 0   Do you have animals or pets at home? No   Who is requesting discharge planning? Provider   Home or Post Acute Services None   Expected Discharge Disposition Home   Does the patient need discharge transport arranged? No   Financial Resource Strain   How hard is it for you to pay for the very basics like food, housing, medical care, and heating? Not very   Housing Stability   In the last 12 months, was there a time when you were not able to pay the mortgage or rent on time? N   In the past 12 months, how many times have you moved where you were living? 1   At any time in the past 12 months, were you homeless or living in a shelter (including now)? N   Transportation Needs   In the past 12 months, has lack of transportation kept you from medical appointments or from getting medications? no   In the past 12 months, has lack of transportation kept you from meetings, work, or from getting things needed for daily living? No   Patient Choice   Provider Choice list and CMS website (https://medicare.gov/care-compare#search) for post-acute Quality and Resource Measure Data were provided and reviewed with: Patient   Patient / Family choosing to utilize agency / facility established prior to hospitalization No     Transitional Care Coordinator Note:  7/16/2024@15:00 Met with patient to introduce myself, role and discuss discharge planning s/p admission.  Patient lives with children  Independent in all ADL's. Does Not  require assist devices for ambulation. Demographics and contact information confirmed.  Will continue to monitor patient for all home going needs.  James Barajas RN [facility] [phone]              Previous Home Care  None   DME  None   Pharmacy: [facility]   Falls: None   PCP   DESMOND MARSHALL   O2/Cpap- None   Dialysis  None   Transportation at discharge- Has transportation

## 2024-07-17 NOTE — PROGRESS NOTES
Vancomycin Dosing by Pharmacy- Cessation of Therapy    Consult to pharmacy for vancomycin dosing has been discontinued by the prescriber, pharmacy will sign off at this time.    Please call pharmacy if there are further questions or re-enter a consult if vancomycin is resumed.     Russ Rosado, DebbieD

## 2024-07-17 NOTE — CONSULTS
[facility]  ACUTE CARE SURGERY - CONSULT    Patient Name: Lorri Sams  MRN: [id number]  Admit Date: 716  : 1980  AGE: 44 y.o.   GENDER: female  ==============================================================================  TODAY'S ASSESSMENT AND PLAN OF CARE:  Ms. Sams is a 44 year old female with PMH HFpEF, DVT and PE () noncompliant with Rivaroxaban, HTN who is currently admitted for heart failure exacerbation. ACS was consulted for a left gluteal wound at a site where she gets naltrexone shots. Patient is HDS, labs are WNL. On exam the wound tracks anteriorly. There is no crepitus or areas of fluctuance; wound is overall healthy appearing and does not require acute surgical intervention. Would recommend local wound care to the area with packing of the wound.      Recommendations:  - Local wound care to the area with wound packing  - Encourage daily showers   - No need for CT or antibiotics unless patient deteriorates clinically or appearance of wound becomes more concerning  - No indication for acute surgical intervention     Patient seen with Dr. Mojica and Dr. Jacobo. Discussed with the attending, Dr. Moe.     Katherin Blanton MD  PGY2 General Surgery   ACS [id number]     ==============================================================================  REASON FOR CONSULT:  Left gluteal wound     PAST MEDICAL HISTORY:   PMH:   Past Medical History:   Diagnosis Date    Acute pharyngitis, unspecified     Sore throat    Adjustment disorder, unspecified     Hospitalism    Deep phlebothrombosis in pregnancy, unspecified trimester ([facility])     DVT (deep vein thrombosis) in pregnancy    Dorsopathy, unspecified     Back problem    Encounter for screening for malignant neoplasm of vagina     Vaginal Pap smear    Essential (primary) hypertension 2013    Hypertension    Headache, unspecified 2013    Headache    Other conditions influencing health status      History of pregnancy    Personal history of other complications of pregnancy, childbirth and the puerperium     History of spontaneous     Personal history of other diseases of the respiratory system     History of upper respiratory infection    Personal history of other diseases of the respiratory system     History of pharyngitis    Personal history of other diseases of the respiratory system     History of allergic rhinitis    Streptococcal infection, unspecified site     Streptococcus group B infection     PSH:   Past Surgical History:   Procedure Laterality Date     SECTION, LOW TRANSVERSE  2014     Section Low Transverse     FH:   No family history on file.  SOCIAL HISTORY:    Smoking:   Social History     Tobacco Use   Smoking Status Every Day    Current packs/day: 1.00    Types: Cigars, Cigarettes   Smokeless Tobacco Not on file       Alcohol:   Social History     Substance and Sexual Activity   Alcohol Use Not Currently       MEDICATIONS:   Prior to Admission medications    Medication Sig Start Date End Date Taking? Authorizing Provider   bumetanide (Bumex) 1 mg tablet Take 1 tablet (1 mg) by mouth once daily. Take additional tablet if weight gain of 3+ lbs 23 Yes Qasim Maza MD   carvedilol (Coreg) 25 mg tablet Take 1 tablet (25 mg) by mouth 2 times a day with meals. 23  Yes Qasim Maza MD   cephalexin (Keflex) 500 mg capsule Take 1 capsule (500 mg) by mouth 3 times a day. For 10 days 7/3/24  Yes Historical Provider, MD   naltrexone ER (Vivitrol) injection Inject 4 mL (380 mg) into the muscle 1 time.   Yes Historical Provider, MD   neomycin-polymyxin-pramoxine (Neosporin) 3.5-10,000-10 mg-unit-mg/gram cream Apply 1 Application topically 2 times a day.   Yes Historical Provider, MD   amLODIPine (Norvasc) 10 mg tablet Take 1 tablet (10 mg) by mouth once daily. 23  Qasim Maza MD   hydrALAZINE (Apresoline) 25 mg tablet Take 1  tablet (25 mg) by mouth 3 times a day. 11/17/23   Qasim Maza MD   losartan (Cozaar) 100 mg tablet Take 1 tablet (100 mg) by mouth once daily. 11/17/23   Qasim Maza MD   naltrexone (Depade) 50 mg tablet Take 1 tablet (50 mg) by mouth once daily.    Historical Provider, MD   rivaroxaban (Xarelto) 20 mg tablet Take 1 tablet (20 mg) by mouth once daily in the evening. Take with meals. Take with food. Do not start before November 16, 2023. 11/17/23 12/17/23  Qasim Maza MD   spironolactone (Aldactone) 25 mg tablet Take 1 tablet (25 mg) by mouth once daily. 11/17/23   Qasim Maza MD   ibuprofen 200 mg tablet Take 2 tablets (400 mg) by mouth every 6 hours if needed for headaches or mild pain (1 - 3). 11/17/23 7/16/24  Qasim Maza MD     ALLERGIES:   No Known Allergies    REVIEW OF SYSTEMS:  Review of Systems   All other systems reviewed and are negative.    PHYSICAL EXAM:  Physical Exam  Constitutional:       General: She is not in acute distress.     Appearance: She is not ill-appearing.   Cardiovascular:      Rate and Rhythm: Normal rate and regular rhythm.   Pulmonary:      Effort: No respiratory distress.   Abdominal:      General: There is no distension.      Palpations: Abdomen is soft.      Tenderness: There is no abdominal tenderness.   Musculoskeletal:         General: Normal range of motion.   Skin:     General: Skin is warm and dry.      Comments: 1x2cm area of skin breakdown on left buttock which tracks anteriorly 1-2cm, no erythema, no crepitus, minimal thin purulent drainage    Neurological:      General: No focal deficit present.      Mental Status: She is alert.   Psychiatric:         Mood and Affect: Mood normal.       IMAGING SUMMARY:  (summary of findings, not a copy of dictation)  None     LABS:  Results from last 7 days   Lab Units 07/16/24  1008   WBC AUTO x10*3/uL 7.1   HEMOGLOBIN g/dL 13.6   HEMATOCRIT % 40.2   PLATELETS AUTO x10*3/uL 215   NEUTROS PCT AUTO %  60.7   LYMPHS PCT AUTO % 29.1   MONOS PCT AUTO % 7.9   EOS PCT AUTO % 0.8     Results from last 7 days   Lab Units 07/16/24  1823   APTT seconds 66*   INR  1.4*     Results from last 7 days   Lab Units 07/16/24  1008   SODIUM mmol/L 136   POTASSIUM mmol/L 4.7   CHLORIDE mmol/L 103   CO2 mmol/L 22   BUN mg/dL 20   CREATININE mg/dL 1.31*   CALCIUM mg/dL 8.2*   PROTEIN TOTAL g/dL 6.6   BILIRUBIN TOTAL mg/dL 0.8   ALK PHOS U/L 145*   ALT U/L 22   AST U/L 26   GLUCOSE mg/dL 139*     Results from last 7 days   Lab Units 07/16/24  1008   BILIRUBIN TOTAL mg/dL 0.8             I have reviewed all laboratory and imaging results ordered/pertinent for this encounter.

## 2024-07-17 NOTE — CARE PLAN
Problem: Skin  Goal: Participates in plan/prevention/treatment measures  Outcome: Progressing  Flowsheets (Taken 7/17/2024 8056)  Participates in plan/prevention/treatment measures: Increase activity/out of bed for meals     Problem: Pain - Adult  Goal: Verbalizes/displays adequate comfort level or baseline comfort level  Outcome: Progressing     Problem: Safety - Adult  Goal: Free from fall injury  Outcome: Progressing     Patient remained stable. Wound care c/s for L gluteal wound. BP meds re-introduced. Diuresing with PO bumex. Continue to monitor I&Os.

## 2024-07-17 NOTE — PROGRESS NOTES
Vancomycin Dosing by Pharmacy- INITIAL    Lorri Sams is a 44 y.o. year old female who Pharmacy has been consulted for vancomycin dosing for cellulitis, skin and soft tissue. Based on the patient's indication and renal status this patient will be dosed based on a goal AUC of 400-600.     Renal function is currently stable.    Visit Vitals  BP (!) 166/92   Pulse 81   Temp 36.6 °C (97.9 °F) (Temporal)   Resp 20        Lab Results   Component Value Date    CREATININE 1.31 (H) 2024    CREATININE 1.15 (H) 2023    CREATININE 1.33 (H) 2023    CREATININE 1.18 (H) 11/15/2023        Patient weight is as follows:   Vitals:    24 0043   Weight: 73.5 kg (162 lb 0.6 oz)       Cultures:  No results found for the encounter in last 14 days.        No intake/output data recorded.  I/O during current shift:  I/O this shift:  In: 150 [IV Piggyback:150]  Out: -     Temp (24hrs), Av.6 °C (97.9 °F), Min:36.6 °C (97.9 °F), Max:36.6 °C (97.9 °F)         Assessment/Plan     Patient will not be given a loading dose.  Will initiate vancomycin maintenance,  1500 mg every 24 hours.    This dosing regimen is predicted by [employer] to result in the following pharmacokinetic parameters:  Regimen: 1500 mg IV every 24 hours.  Start time: 02:05 on 2024  Exposure target: AUC24 (range)400-600 mg/L.hr   AUC24,ss: 506 mg/L.hr  Probability of AUC24 > 400: 77 %  Ctrough,ss: 14.4 mg/L  Probability of Ctrough,ss > 20: 19 %  Probability of nephrotoxicity (Lodise BRANDON ): 10 %    Follow-up level will be ordered on 718 AM labs unless clinically indicated sooner.  Will continue to monitor renal function daily while on vancomycin and order serum creatinine at least every 48 hours if not already ordered.  Follow for continued vancomycin needs, clinical response, and signs/symptoms of toxicity.       Jnu Doss, PharmD

## 2024-07-17 NOTE — HOSPITAL COURSE
Lorri Sams is a 44 y.o. female  with past medical history of HFpEF (EF 60 to 65% in November, diastolic heart failure, on Bumetanide), DVT and PE 12/2022 (prescribed Rivaroxaban, off for 2 months), HTN (on Amlodipine, Losartan, Hydralazine, and Spironolactone), Former smoker, presenting with chief complaint of abdominal pain and secondary complaint of shortness of breath likely due to being out of her heart failure medication.     In ED, she was found to be hypoxic 89% and Hypertensive to the 187/103. Labwork was notable for troponemia of 159->138, BNP1,020, Lipase 61, D-Dimer 6,572 and VBG Ph 7.42 , PCO2 41 , PO2 37, Lactate 2.2 and CMP w/ elevated Cr. 1.31 (Baseline ~1.1-1.2). Given elevated D-Dimer, CT-PE was ordered and was negative for PE but, did note mild pulmonary edema and enlarged mediastinal lymph nodes. CXR noted right lower lobe opacity which could represent infectious vs atelectasis changes. DVT U/S noted left posterior tibial occlusive thrombus and chronic left femoral vein thrombus. The patient was started on Heparin gtt and diuresed with IV Lasix. She was started on empiric antibiotics for CAP that was later broadened to cover for SSTI of the L. Gluteal region. The patient's KUB showed mild constipation, she had admitted to being lactose intolerant and eating dairy. She was admitted to the [facility] under [facility].    Floor Course:  Patient's BP improved with slow reintroduction of home meds. Her breathing improved with gentle diuresis, transitioned to PO Bumex prior to discharge.    TTE 7/17/24  CONCLUSIONS:   1. Left ventricular ejection fraction is normal, by visual estimate at 60%.   2. Basal inferior segment is abnormal.   3. There is moderately reduced right ventricular systolic function.   4. The left atrium is moderately dilated.   5. Mild aortic valve stenosis.   6. Moderate aortic valve regurgitation.   7. There is mild to moderate mitral valve stenosis.   8. Moderate to severe mitral valve  regurgitation.   9. Mild to moderate tricuspid regurgitation visualized.  10. Severely elevated pulmonary artery pressure.    Venous Duplex US BLE 7/16/24  IMPRESSION:  1.  Left posterior tibial vein occlusive thrombosis.  2. Peripheral likely chronic left common femoral vein nonocclusive  thrombus, which has slightly improved compared to 03/07/2018 Doppler  ultrasound.  3. No sonographic evidence for deep vein thrombosis within the  evaluated veins of the right lower extremity.    General surgery evaluated L gluteal wound and recommended wound care, no further imaging or intervention. IV antibiotics stopped, low suspicion for PNA, transitioned to PO Doxycycline 7 day course for gluteal wound.     Heparin gtt was transitioned to Xarelto starter pack. CT scan did show evidence of emphysema. Pulmonology follow up scheduled.     Medications delivered to bedside prior to discharge. Home care orders placed for wound care and CHF follow up. Cardiology follow up scheduled.     Upon review of medications, lab values, and vital signs.  Pt was deemed stable for discharge in satisfactory condition.     Discharge weight: 71.3 kg     More than 60 minutes were spent in coordinating patient discharge.

## 2024-07-17 NOTE — CARE PLAN
The patient's goals for the shift include  getting adequate sleep to promote healing.    The clinical goals for the shift include pt will remain hemodynamically stable throughout the shift

## 2024-07-17 NOTE — PROGRESS NOTES
"Subjective Data: Patient sitting upright in bed. She reports her breathing is improved. Reports not taking home medications including xarelto due to running out of medications not able to get into follow up appointment. She reports improved abdominal pain. She denies N/V fevers or chills. She denies URI symptoms.   - Stop Heparin -> Xarelto starter pack  - Stop IV antibiotics and monitor  - Resume home Bumex PO  - Slowly reintroduce BP medications  - Trial duonebs  - Referral to Cardiology, pulmonology, primary care.    Overnight Events:    NAEON     Objective Data:  Last Recorded Vitals:  Vitals:    07/16/24 2030 07/17/24 0043 07/17/24 0245 07/17/24 0858   BP: (!) 166/92  (!) 166/92 (!) 168/105   BP Location:    Right arm   Patient Position:    Lying   Pulse: 81  81 71   Resp: 20  20 18   Temp:   36.6 °C (97.9 °F) 36.3 °C (97.3 °F)   TempSrc:   Temporal Temporal   SpO2: 94%  94% 99%   Weight:  73.5 kg (162 lb 0.6 oz) 73.5 kg (162 lb 0.6 oz)    Height:   1.651 m (5' 5\")        Last Labs:  CBC - 7/17/2024:  6:16 AM  6.9 12.1 213    38.4      CMP - 7/17/2024:  6:16 AM  7.8 6.6 26 --- 0.8   3.2 2.7 22 145      PTT - 7/16/2024:  6:23 PM  1.4   15.7 66     TROPHS   Date/Time Value Ref Range Status   07/16/2024 11:55  0 - 34 ng/L Final     Comment:     Previous result verified on 7/16/2024 1144 on specimen/case [id number] called with component [facility] for procedure Troponin I, High Sensitivity with value 159 ng/L.   07/16/2024 10:08  0 - 34 ng/L Final   11/14/2023 03:17 PM 75 0 - 34 ng/L Final   11/14/2023 01:49  0 - 34 ng/L Final     BNP   Date/Time Value Ref Range Status   07/16/2024 10:08 AM 1,020 0 - 99 pg/mL Final   11/14/2023 01:49 PM 1,485 0 - 99 pg/mL Final     HGBA1C   Date/Time Value Ref Range Status   12/07/2022 03:12 AM 6.1 4.0 - 5.6 % Final      Last I/O:  I/O last 3 completed shifts:  In: 239.9 (3.3 mL/kg) [I.V.:89.9 (1.2 mL/kg); IV Piggyback:150]  Out: - (0 mL/kg)   Weight: 73.5 kg "       Ejection Fractions:  EF   Date/Time Value Ref Range Status   07/17/2024 08:35 AM 60 %    11/16/2023 11:13 AM 60         Vascular US lower extremity venous duplex bilateral 7/16/2024    1. Left posterior tibial vein occlusive thrombosis. 2. Chronic left common femoral vein partial thrombus seen on prior ultrasound 03/07/2018. 3. No sonographic evidence for deep vein thrombosis within the evaluated veins of the right lower extremity       CT angio chest for pulmonary embolism 7/16/2024    1. No evidence of acute pulmonary embolism. 2. Suggestion of mild interstitial pulmonary edema and new trace bilateral pleural effusions, superimposed against background mosaic attenuation and emphysema. Correlate with patient's volume status. 3. Reflux of intravenous contrast into IVC and hepatic veins, suggestive of elevated right heart pressures. Poor contrast opacification of the left heart, suggestive of left heart dysfunction.  Recommend correlation with echocardiogram. 4. Multiple prominent to mildly enlarged mediastinal lymph nodes, which appears slightly more conspicuous than prior examination. The findings are nonspecific but could be seen with pulmonary edema or lymphoproliferative disease. 5. Additional findings as above.       XR chest 7/16/2024    1. Mild, left-greater-than-right pleural effusions and pulmonary vascular congestion consistent with acute heart failure exacerbation. 2. Right lower lobe interstitial opacity which could represent acute infectious process versus atelectatic change.     XR abdomen 7/16/2024  Nonobstructive bowel gas pattern. Moderate amount of stool burden.      TTE 11/16/2023   1. Left ventricular systolic function is normal with a 60-65% estimated ejection fraction.   2. Increased LV mass.   3. Spectral Doppler shows an abnormal pattern of left ventricular diastolic filling.   4. There is severe concentric left ventricular hypertrophy.   5. The left atrium is moderately dilated.   6.  "Moderate mitral valve regurgitation.   7. Mildly elevated RVSP.   8. Mild aortic valve stenosis.   9. Moderate to severe aortic valve regurgitation.      Inpatient Medications:  Scheduled medications   Medication Dose Route Frequency    amLODIPine  10 mg oral Daily    bumetanide  1 mg oral Daily    carvedilol  25 mg oral BID    hydrALAZINE  25 mg oral TID    ipratropium-albuteroL  3 mL nebulization q6h    [Held by provider] losartan  100 mg oral Daily    perflutren lipid microspheres  0.5-10 mL of dilution intravenous Once in imaging    perflutren protein A microsphere  0.5 mL intravenous Once in imaging    polyethylene glycol  17 g oral Daily    rivaroxaban  15 mg oral BID    Followed by    [START ON 8/7/2024] rivaroxaban  20 mg oral Daily with breakfast    sennosides-docusate sodium  1 tablet oral Nightly    spironolactone  25 mg oral Daily    sulfur hexafluoride microsphr  2 mL intravenous Once in imaging     PRN medications   Medication    acetaminophen    benzonatate     Continuous Medications   Medication Dose Last Rate       Physical Exam:  BP (!) 168/105 (BP Location: Right arm, Patient Position: Lying) Comment: RN notified  Pulse 71   Temp 36.3 °C (97.3 °F) (Temporal)   Resp 18   Ht 1.651 m (5' 5\")   Wt 73.5 kg (162 lb 0.6 oz)   SpO2 99%   BMI 26.96 kg/m²    General: NAD, well appearing, alert  Skin: warm and dry throughout, no abrasions or ecchymosis  Head/ neck: no JVD seen at 90 degrees  Cardiac: RRR, no murmurs or rubs  Pulm:+Rhonchi  GI: soft, nontender, non-distended, +BS  Extremities: no LE edema   Neuro: no focal neuro deficits, A&Ox3  Psych: appropriate mood and behavior       Assessment/Plan   Lorri Sams is a 44 y.o. female  with past medical history of HFpEF (EF 60 to 65% in November, diastolic heart failure, on Bumetanide), DVT and PE 12/2022 (prescribed Rivaroxaban, off for 2 months), HTN (on Amlodipine, Losartan, Hydralazine, and Spironolactone), presenting with chief complaint of " abdominal pain and secondary complaint of shortness of breath likely due to being out of her heart failure medication.     #Acute on Chronic HFpEF  - TTE 11/16/23: LKVEF 60-65%, increased LV mass, abnormal DD, severe LVH, LA dilation, moderate MR, mild AS, mod-severe AR  -Presents with increasing SOB and cough since being off her medication.  -CT Angio: mild pulmonary edema, mild pleural effusions  -BNP 1K  -HSTrop 159-138 likely demand ischemia  -Repeat TTE results pending  -Received Lasix 40mg BID in the ER  -Resume Spironolactone  -Resume Bumex 1mg daily  -Heart Healthy diet   -Daily weights, Monitor electrolytes and I&Os    #DVT  #History of PE  -Patient off home Xarelto 20mg  -Lower leg U/S: posterior tibial vein occlusive thrombosis  -CTA negative for PE  -Stop Heparin gtt  -Xarelto starter pack    #Hypertension  #LV Hypertrophy  -Hypertensive on arrival and throughout admission   -LVH on previous echocardiogram   -Home meds: Norvasc 10mg, Carvedilol 25mg BID, Hydralazine 25mg TID, Losartan 100mg daily, Spironolactone 25mg daily  -Continue Norvasc, Coreg 12.5mg BID  -Resume spironolactone  -Likely resume Losartan vs during admission  -Encourage medication compliance and home BP monitoring  -DASH diet    #L. Gluteal Wound  -Present where patient received naltrexone shots  -Evaluate by general surgery: Wound care, no further imaging or intervention  -Non-infectious appearing  -PO doxy x7 days  -Wound care consult    # Emphysema  -No PFTs on file  -CT shows evidence of Emphysema  -Refer to pulmonology for follow up and PFTs    #SOB   -Concern for Pneumonia vs ADHF on admission  -More likely HF  -Negative CRP  -Afebrile   -No leukocytosis  -Urine strep, Legionella, Procal pending   -Blood cultures obtained on admission  - Monitor off antibiotics     #Abdominal Pain, resolved  #Constipation vs Lactose Intolerance  -Patient with prior lactose intolerance presenting with crampy abdominal pain and constipation after  dairy consumption   -KUB (7/16) - Nonobstructive bowel gas pattern. Moderate amount of stool burden.   -Miralax 17 daily, Doc-senna 8.6-50 qhs      #Enlarged Mediastinal Lymph Nodes  -CTA (7/16): Multiple prominent to mildly enlarged mediastinal lymph nodes,  which appears slightly more conspicuous than prior examination. The findings are nonspecific but could be seen with pulmonary edema or lymphoproliferative disease.  -Consider repeat CT imaging outpatient     #CKD3a  -Likely induced by HTN  -UA 1+ proteinuria  -BP control and compliance  -Add on A1C  -sCr. At baseline, monitor      Peripheral IV 07/16/24 20 G Right;Anterior Forearm (Active)   Site Assessment Clean;Dry;Intact 07/17/24 0900   Dressing Type Transparent 07/17/24 0900   Line Status Flushed;Capped 07/17/24 0900   Dressing Status Clean;Dry 07/17/24 0900   Number of days: 1       Peripheral IV 07/16/24 20 G Left;Anterior Forearm (Active)   Site Assessment Clean;Dry;Intact 07/17/24 0900   Dressing Type Transparent 07/17/24 0900   Line Status Flushed;Capped 07/17/24 0900   Dressing Status Clean;Dry 07/17/24 0900   Number of days: 1       Code Status:  Full Code    I spent>30 minutes in the professional and overall care of this patient.  Patient seen and discussed with Dr. Jossie Bean PA-C    I conducted a shared care visit with the SHARRON    She reports feeling better today compared to admission, we reviewed her echocardiogram showing significant valvular disease likely the  of dyspnea when combined with her HTN, but LVEF remains preserved, will switch to oral diuretics and focus on afterload reduction 52 minutes spent reviewing data, echo discussing with patient. Outpatient follow will be important it sounds like she has re-established at this point

## 2024-07-18 ENCOUNTER — PHARMACY VISIT (OUTPATIENT)
Dept: PHARMACY | Facility: CLINIC | Age: 44
End: 2024-07-18
Payer: MEDICAID

## 2024-07-18 ENCOUNTER — HOME HEALTH ADMISSION (OUTPATIENT)
Dept: HOME HEALTH SERVICES | Facility: HOME HEALTH | Age: 44
End: 2024-07-18
Payer: COMMERCIAL

## 2024-07-18 ENCOUNTER — DOCUMENTATION (OUTPATIENT)
Dept: HOME HEALTH SERVICES | Facility: HOME HEALTH | Age: 44
End: 2024-07-18
Payer: COMMERCIAL

## 2024-07-18 VITALS
RESPIRATION RATE: 16 BRPM | TEMPERATURE: 95.7 F | HEIGHT: 65 IN | BODY MASS INDEX: 26.19 KG/M2 | SYSTOLIC BLOOD PRESSURE: 134 MMHG | OXYGEN SATURATION: 95 % | HEART RATE: 64 BPM | DIASTOLIC BLOOD PRESSURE: 85 MMHG | WEIGHT: 157.19 LBS

## 2024-07-18 PROBLEM — R06.02 SHORTNESS OF BREATH: Status: RESOLVED | Noted: 2024-07-16 | Resolved: 2024-07-18

## 2024-07-18 LAB
LEGIONELLA AG UR QL: NEGATIVE
S PNEUM AG UR QL: NEGATIVE

## 2024-07-18 PROCEDURE — 2500000001 HC RX 250 WO HCPCS SELF ADMINISTERED DRUGS (ALT 637 FOR MEDICARE OP): Performed by: STUDENT IN AN ORGANIZED HEALTH CARE EDUCATION/TRAINING PROGRAM

## 2024-07-18 PROCEDURE — 2500000004 HC RX 250 GENERAL PHARMACY W/ HCPCS (ALT 636 FOR OP/ED)

## 2024-07-18 PROCEDURE — 2500000001 HC RX 250 WO HCPCS SELF ADMINISTERED DRUGS (ALT 637 FOR MEDICARE OP)

## 2024-07-18 PROCEDURE — RXMED WILLOW AMBULATORY MEDICATION CHARGE

## 2024-07-18 PROCEDURE — 99239 HOSP IP/OBS DSCHRG MGMT >30: CPT | Performed by: INTERNAL MEDICINE

## 2024-07-18 PROCEDURE — 2500000002 HC RX 250 W HCPCS SELF ADMINISTERED DRUGS (ALT 637 FOR MEDICARE OP, ALT 636 FOR OP/ED)

## 2024-07-18 PROCEDURE — 2500000002 HC RX 250 W HCPCS SELF ADMINISTERED DRUGS (ALT 637 FOR MEDICARE OP, ALT 636 FOR OP/ED): Performed by: STUDENT IN AN ORGANIZED HEALTH CARE EDUCATION/TRAINING PROGRAM

## 2024-07-18 RX ORDER — BUMETANIDE 1 MG/1
1 TABLET ORAL DAILY
Qty: 90 TABLET | Refills: 1 | Status: SHIPPED | OUTPATIENT
Start: 2024-07-18 | End: 2025-01-14

## 2024-07-18 RX ORDER — DOXYCYCLINE 100 MG/1
100 CAPSULE ORAL EVERY 12 HOURS SCHEDULED
Qty: 12 CAPSULE | Refills: 0 | Status: SHIPPED | OUTPATIENT
Start: 2024-07-17 | End: 2024-07-24

## 2024-07-18 RX ORDER — CARVEDILOL 25 MG/1
25 TABLET ORAL 2 TIMES DAILY
Status: DISCONTINUED | OUTPATIENT
Start: 2024-07-18 | End: 2024-07-18 | Stop reason: HOSPADM

## 2024-07-18 RX ORDER — LOSARTAN POTASSIUM 50 MG/1
50 TABLET ORAL DAILY
Status: DISCONTINUED | OUTPATIENT
Start: 2024-07-18 | End: 2024-07-18 | Stop reason: HOSPADM

## 2024-07-18 RX ORDER — ACETAMINOPHEN 325 MG/1
975 TABLET ORAL EVERY 6 HOURS PRN
Start: 2024-07-18

## 2024-07-18 RX ORDER — AMLODIPINE BESYLATE 10 MG/1
10 TABLET ORAL DAILY
Qty: 90 TABLET | Refills: 1 | Status: SHIPPED | OUTPATIENT
Start: 2024-07-18 | End: 2025-01-14

## 2024-07-18 RX ORDER — SPIRONOLACTONE 25 MG/1
25 TABLET ORAL DAILY
Qty: 30 TABLET | Refills: 5 | Status: SHIPPED | OUTPATIENT
Start: 2024-07-19 | End: 2025-01-15

## 2024-07-18 RX ORDER — LOSARTAN POTASSIUM 50 MG/1
50 TABLET ORAL DAILY
Qty: 30 TABLET | Refills: 5 | Status: SHIPPED | OUTPATIENT
Start: 2024-07-19 | End: 2025-01-15

## 2024-07-18 RX ORDER — CARVEDILOL 25 MG/1
25 TABLET ORAL 2 TIMES DAILY
Qty: 180 TABLET | Refills: 1 | Status: SHIPPED | OUTPATIENT
Start: 2024-07-18 | End: 2025-01-14

## 2024-07-18 RX ORDER — LANOLIN ALCOHOL/MO/W.PET/CERES
800 CREAM (GRAM) TOPICAL ONCE
Status: DISCONTINUED | OUTPATIENT
Start: 2024-07-18 | End: 2024-07-18

## 2024-07-18 RX ORDER — RIVAROXABAN 15 MG-20MG
1 KIT ORAL 2 TIMES DAILY
Qty: 51 EACH | Refills: 0 | Status: SHIPPED | OUTPATIENT
Start: 2024-07-18 | End: 2024-08-17

## 2024-07-18 ASSESSMENT — COGNITIVE AND FUNCTIONAL STATUS - GENERAL
MOBILITY SCORE: 24
DAILY ACTIVITIY SCORE: 24

## 2024-07-18 ASSESSMENT — PAIN - FUNCTIONAL ASSESSMENT: PAIN_FUNCTIONAL_ASSESSMENT: 0-10

## 2024-07-18 ASSESSMENT — PAIN SCALES - GENERAL: PAINLEVEL_OUTOF10: 0 - NO PAIN

## 2024-07-18 NOTE — NURSING NOTE
Nursing Discharge Planning Note 7/18/24 1541    Patient provided with discharge instructions. Instructions reviewed with patient, including medication changes & side effects. VS stable. IVs removed with tips intact & no complications. Follow-up appointments provided. Medications delivered to bedside via Meds-to-Beds and in hand at time of discharge. Extra wound care supplies given to patient. Patient instructed on how to perform dressing change. Script for BP cuff given to patient. Patient verbalized understanding of discharge instructions. Removed from telemetry & ambulated with all belongings to [facility] where patient's friend provided transportation home. Patient discharged with new home care. Shellie Bowling RN   
underweight

## 2024-07-18 NOTE — HH CARE COORDINATION
Home Care received a Referral for Nursing. We have processed the referral for a Start of Care on 7/20/24.     If you have any questions or concerns, please feel free to contact us at [phone]. Follow the prompts, enter your five digit zip code, and you will be directed to your care team on [facility] 3.

## 2024-07-18 NOTE — DISCHARGE INSTRUCTIONS
Please take the medications listed on these instructions as prescribed until you are seen at a follow up appointment.     PCP, Cardiology, and Pulmonology appointments have been scheduled for you. Please call to reschedule or cancel if you are unable to make the scheduled time or if you choose to follow up with another provider.     It was a pleasure to have met and care for you!

## 2024-07-19 LAB
B-LACTAMASE ORGANISM ISLT: POSITIVE
BACTERIA SPEC CULT: ABNORMAL
GRAM STN SPEC: ABNORMAL
GRAM STN SPEC: ABNORMAL

## 2024-07-20 ENCOUNTER — HOME CARE VISIT (OUTPATIENT)
Dept: HOME HEALTH SERVICES | Facility: HOME HEALTH | Age: 44
End: 2024-07-20

## 2024-07-21 LAB
BACTERIA BLD CULT: NORMAL
BACTERIA BLD CULT: NORMAL

## 2024-07-22 ENCOUNTER — APPOINTMENT (OUTPATIENT)
Dept: PRIMARY CARE | Facility: CLINIC | Age: 44
End: 2024-07-22
Payer: COMMERCIAL

## 2024-07-23 NOTE — DOCUMENTATION CLARIFICATION NOTE
"    PATIENT:               CASEY BAUM  ACCT #:                  [id number]  MRN:                       [id number]  :                       1980  ADMIT DATE:       2024 9:41 AM  DISCH DATE:        2024 4:01 PM  RESPONDING PROVIDER #:        [id number]          PROVIDER RESPONSE TEXT:    Demand Ischemia    CDI QUERY TEXT:    Clarification        Instruction:    Based on your assessment of the patient and the clinical information, please provide the requested documentation by clicking on the appropriate radio button and enter any additional information if prompted.    Question: Is there a diagnosis indicative of the patient elevated Troponins and symptoms    When answering this query, please exercise your independent professional judgment. The fact that a question is being asked, does not imply that any particular answer is desired or expected.    The patient's clinical indicators include:  Clinical Information: 45y/o F presents to ED with abd pain and SOB    Clinical Indicators: \"Demand Ischemia\" is documented in ED Provider Note on  and Cardiology Progress Note on .    VS  at 0954: T 36.6- HR 89- RR 20- /103, pox 95 RA  HS Trop I, : 159, 138    ED: \"EKG otherwise nonischemic as above, troponin trended from 59-1 38 more consistent with demand ischemia in the context of heart failure than AHMET.'    History and Physical, : \"Labwork was notable for troponemia of 159 -138, BNP1,020... Given elevated D-Dimer, CT-PE was ordered and was negative for PE but, did note mild pulmonary edema and enlarged mediastinal lymph nodes.... Patient was given Hydralazine 25mg, Amlodipine 10mg Spironolactone 25mg, Ibuprofen 600mg, dicylomine 10mg, IV Lasix 20mg and Rivoraxban 20mg.\"    PN :  \"presenting with chief complaint of abdominal pain and secondary complaint of shortness of breath likely due to being out of her heart failure medication\" and  \"HSTrop 159-138 likely demand ischemia\"    TTE, " "7/17: \"1. Left ventricular ejection fraction is normal, by visual estimate at 60pct.  2. Basal inferior segment is abnormal.\"      Treatment: EKG, TTE, Telemetry, IV Lasix, Hydralazine PO, Amlodipine PO    Risk Factors: HTN, Heart Failure  Options provided:  -- Demand Ischemia  -- Acute Myocardial Injury  -- Chronic Myocardial Injury  -- Other - I will add my own diagnosis  -- Refer to Clinical Documentation Reviewer    Query created by: Beth Childs on 7/22/2024 10:24 AM      Electronically signed by:  JULIETTE JIMENEZ MD 7/23/2024 7:26 AM          "

## 2024-07-25 NOTE — DOCUMENTATION CLARIFICATION NOTE
"    PATIENT:               CASEY BAUM  ACCT #:                  [id number]  MRN:                       [id number]  :                       1980  ADMIT DATE:       2024 9:41 AM  DISCH DATE:        2024 4:01 PM  RESPONDING PROVIDER #:        [id number]          PROVIDER RESPONSE TEXT:    Acute Respiratory Failure as evidenced by hypoxia and associated with pulmonary edema on chest Xray    CDI QUERY TEXT:    Clarification        Instruction:    Based on your assessment of the patient and the clinical information, please provide the requested documentation by clicking on the appropriate radio button and enter any additional information if prompted.    Question: Please clarify diagnosis of respiratory failure as    When answering this query, please exercise your independent professional judgment. The fact that a question is being asked, does not imply that any particular answer is desired or expected.    The patient's clinical indicators include:  Clinical Information: Progress notes indicate pt is a 44 yr old female presenting to ED with c/o abd pain and SOB.  Per History and Physical, \"presenting with abdominal pain and complaint of shortness of breath likely due to being out of her heart failure medication.\"    Documented Diagnosis: \"AHRF\" is documented in History and Physical and Significant Event Note on   History and Physical, : \"AHRF - Pneumonia vs ADHF - Will cover with CAP Coverage but low suspicion of underlying infection\"    Clinical Indicators and Documentation:  -Vital Signs,  at 0954: T 36.6- HR 89- RR 20- /103, pox 95 RA  -ABG results: n/a, VBG  at 0951: pH 7.42, pCO2 41, pO2 37  -Physical Exam Findings:  -Breath sounds: Per Significant Event Note, , \"Coarse breath sounds on right side  no increased work of breathing, 2L NC\"  -Distress: Per Significant Event Note, , \"No acute distress, resting comfortably in bed\"  -Cyanosis: n/a  -Oxygen Therapy: " "2LNC  -Pulmonary Consult: n/a  -Per History and Physical on 7/16, \"In ED, she was found to be hypoxic 89 pct.\"  -History and Physical, 7/16: \"Mild, left-greater-than-right pleural effusions and pulmonary vascular congestion consistent with acute heart failure exacerbation\"    Treatment: Supplemental oxygen, Pulse oximetry    Risk Factors: Heart Failure, h/o smoking  Options provided:  -- Acute Respiratory Failure ruled out after further workup  -- Acute Respiratory Failure as evidenced by, Please specify additional information below  -- Other - I will add my own diagnosis  -- Refer to Clinical Documentation Reviewer    Query created by: Beth Childs on 7/17/2024 11:15 AM      Electronically signed by:  BARRERA MUSTAFA MD 7/25/2024 10:35 AM          "

## 2024-08-12 PROCEDURE — RXMED WILLOW AMBULATORY MEDICATION CHARGE

## 2024-08-14 ENCOUNTER — APPOINTMENT (OUTPATIENT)
Dept: CARDIOLOGY | Facility: CLINIC | Age: 44
End: 2024-08-14
Payer: COMMERCIAL

## 2024-08-15 ENCOUNTER — PHARMACY VISIT (OUTPATIENT)
Dept: PHARMACY | Facility: CLINIC | Age: 44
End: 2024-08-15
Payer: MEDICAID

## 2024-08-15 PROCEDURE — RXMED WILLOW AMBULATORY MEDICATION CHARGE

## 2024-08-19 ENCOUNTER — PHARMACY VISIT (OUTPATIENT)
Dept: PHARMACY | Facility: CLINIC | Age: 44
End: 2024-08-19
Payer: MEDICAID

## 2024-08-27 ENCOUNTER — APPOINTMENT (OUTPATIENT)
Dept: CARDIOLOGY | Facility: HOSPITAL | Age: 44
End: 2024-08-27
Payer: COMMERCIAL

## 2024-08-30 ENCOUNTER — APPOINTMENT (OUTPATIENT)
Dept: PRIMARY CARE | Facility: CLINIC | Age: 44
End: 2024-08-30
Payer: COMMERCIAL

## 2024-09-18 PROBLEM — I50.20 HEART FAILURE WITH REDUCED EJECTION FRACTION: Status: ACTIVE | Noted: 2023-01-28

## 2024-09-18 PROBLEM — I35.9 AORTIC VALVE DISORDER: Status: ACTIVE | Noted: 2023-01-28

## 2024-09-18 PROBLEM — J02.9 SORE THROAT: Status: ACTIVE | Noted: 2024-09-18

## 2024-09-18 PROBLEM — S82.143A CLOSED FRACTURE OF TIBIAL PLATEAU: Status: ACTIVE | Noted: 2024-07-16

## 2024-09-18 PROBLEM — R51.9 HEADACHE: Status: ACTIVE | Noted: 2024-09-18
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
## 2024-09-18 PROBLEM — O22.30 DEEP VEIN THROMBOSIS (DVT), ANTEPARTUM (HHS-HCC): Status: ACTIVE | Noted: 2024-09-18

## 2024-09-18 PROBLEM — M53.9 BACK PROBLEM: Status: ACTIVE | Noted: 2024-09-18

## 2024-09-18 PROBLEM — F43.20 HOSPITALISM: Status: ACTIVE | Noted: 2024-09-18

## 2024-09-18 PROBLEM — A49.1 INFECTION DUE TO STREPTOCOCCUS AGALACTIAE: Status: ACTIVE | Noted: 2024-09-18

## 2024-09-19 PROCEDURE — RXMED WILLOW AMBULATORY MEDICATION CHARGE

## 2024-09-20 ENCOUNTER — PHARMACY VISIT (OUTPATIENT)
Dept: PHARMACY | Facility: CLINIC | Age: 44
End: 2024-09-20
Payer: MEDICAID

## 2024-10-07 PROCEDURE — RXMED WILLOW AMBULATORY MEDICATION CHARGE

## 2024-10-09 ENCOUNTER — PHARMACY VISIT (OUTPATIENT)
Dept: PHARMACY | Facility: CLINIC | Age: 44
End: 2024-10-09
Payer: MEDICAID

## 2025-04-16 ENCOUNTER — PHARMACY VISIT (OUTPATIENT)
Dept: PHARMACY | Facility: CLINIC | Age: 45
End: 2025-04-16
Payer: MEDICARE

## 2025-04-16 PROCEDURE — RXMED WILLOW AMBULATORY MEDICATION CHARGE
